# Patient Record
Sex: MALE | Race: OTHER | NOT HISPANIC OR LATINO | URBAN - METROPOLITAN AREA
[De-identification: names, ages, dates, MRNs, and addresses within clinical notes are randomized per-mention and may not be internally consistent; named-entity substitution may affect disease eponyms.]

---

## 2019-07-22 ENCOUNTER — INPATIENT (INPATIENT)
Facility: HOSPITAL | Age: 59
LOS: 0 days | Discharge: HOME | DRG: 313 | End: 2019-07-23
Attending: INTERNAL MEDICINE | Admitting: INTERNAL MEDICINE
Payer: MEDICAID

## 2019-07-22 VITALS
OXYGEN SATURATION: 97 % | HEIGHT: 74 IN | WEIGHT: 179.9 LBS | TEMPERATURE: 98 F | SYSTOLIC BLOOD PRESSURE: 126 MMHG | HEART RATE: 79 BPM | RESPIRATION RATE: 20 BRPM | DIASTOLIC BLOOD PRESSURE: 83 MMHG

## 2019-07-22 DIAGNOSIS — R53.1 WEAKNESS: ICD-10-CM

## 2019-07-22 DIAGNOSIS — Z95.5 PRESENCE OF CORONARY ANGIOPLASTY IMPLANT AND GRAFT: ICD-10-CM

## 2019-07-22 DIAGNOSIS — I24.9 ACUTE ISCHEMIC HEART DISEASE, UNSPECIFIED: ICD-10-CM

## 2019-07-22 DIAGNOSIS — E11.8 TYPE 2 DIABETES MELLITUS WITH UNSPECIFIED COMPLICATIONS: ICD-10-CM

## 2019-07-22 DIAGNOSIS — Z29.9 ENCOUNTER FOR PROPHYLACTIC MEASURES, UNSPECIFIED: ICD-10-CM

## 2019-07-22 DIAGNOSIS — I25.10 ATHEROSCLEROTIC HEART DISEASE OF NATIVE CORONARY ARTERY WITHOUT ANGINA PECTORIS: ICD-10-CM

## 2019-07-22 LAB
ALBUMIN SERPL ELPH-MCNC: 4 G/DL — SIGNIFICANT CHANGE UP (ref 3.5–5)
ALBUMIN SERPL ELPH-MCNC: 4.1 G/DL — SIGNIFICANT CHANGE UP (ref 3.5–5)
ALP SERPL-CCNC: 49 U/L — SIGNIFICANT CHANGE UP (ref 40–120)
ALP SERPL-CCNC: 50 U/L — SIGNIFICANT CHANGE UP (ref 40–120)
ALT FLD-CCNC: 25 U/L DA — SIGNIFICANT CHANGE UP (ref 10–60)
ALT FLD-CCNC: 27 U/L DA — SIGNIFICANT CHANGE UP (ref 10–60)
ANION GAP SERPL CALC-SCNC: 5 MMOL/L — SIGNIFICANT CHANGE UP (ref 5–17)
ANION GAP SERPL CALC-SCNC: 6 MMOL/L — SIGNIFICANT CHANGE UP (ref 5–17)
APPEARANCE UR: CLEAR — SIGNIFICANT CHANGE UP
APTT BLD: 29.9 SEC — SIGNIFICANT CHANGE UP (ref 27.5–36.3)
AST SERPL-CCNC: 17 U/L — SIGNIFICANT CHANGE UP (ref 10–40)
AST SERPL-CCNC: 18 U/L — SIGNIFICANT CHANGE UP (ref 10–40)
BASOPHILS # BLD AUTO: 0.03 K/UL — SIGNIFICANT CHANGE UP (ref 0–0.2)
BASOPHILS NFR BLD AUTO: 0.5 % — SIGNIFICANT CHANGE UP (ref 0–2)
BILIRUB SERPL-MCNC: 0.8 MG/DL — SIGNIFICANT CHANGE UP (ref 0.2–1.2)
BILIRUB SERPL-MCNC: 0.9 MG/DL — SIGNIFICANT CHANGE UP (ref 0.2–1.2)
BILIRUB UR-MCNC: NEGATIVE — SIGNIFICANT CHANGE UP
BUN SERPL-MCNC: 11 MG/DL — SIGNIFICANT CHANGE UP (ref 7–18)
BUN SERPL-MCNC: 8 MG/DL — SIGNIFICANT CHANGE UP (ref 7–18)
CALCIUM SERPL-MCNC: 8.8 MG/DL — SIGNIFICANT CHANGE UP (ref 8.4–10.5)
CALCIUM SERPL-MCNC: 9 MG/DL — SIGNIFICANT CHANGE UP (ref 8.4–10.5)
CHLORIDE SERPL-SCNC: 102 MMOL/L — SIGNIFICANT CHANGE UP (ref 96–108)
CHLORIDE SERPL-SCNC: 106 MMOL/L — SIGNIFICANT CHANGE UP (ref 96–108)
CK MB BLD-MCNC: 1.3 % — SIGNIFICANT CHANGE UP (ref 0–3.5)
CK MB CFR SERPL CALC: 3.5 NG/ML — SIGNIFICANT CHANGE UP (ref 0–3.6)
CK SERPL-CCNC: 274 U/L — HIGH (ref 35–232)
CK SERPL-CCNC: 276 U/L — HIGH (ref 35–232)
CO2 SERPL-SCNC: 29 MMOL/L — SIGNIFICANT CHANGE UP (ref 22–31)
CO2 SERPL-SCNC: 29 MMOL/L — SIGNIFICANT CHANGE UP (ref 22–31)
COLOR SPEC: YELLOW — SIGNIFICANT CHANGE UP
CREAT SERPL-MCNC: 0.82 MG/DL — SIGNIFICANT CHANGE UP (ref 0.5–1.3)
CREAT SERPL-MCNC: 1.04 MG/DL — SIGNIFICANT CHANGE UP (ref 0.5–1.3)
DIFF PNL FLD: NEGATIVE — SIGNIFICANT CHANGE UP
EOSINOPHIL # BLD AUTO: 0.4 K/UL — SIGNIFICANT CHANGE UP (ref 0–0.5)
EOSINOPHIL NFR BLD AUTO: 6.3 % — HIGH (ref 0–6)
EPI CELLS # UR: ABNORMAL /HPF
GLUCOSE BLDC GLUCOMTR-MCNC: 179 MG/DL — HIGH (ref 70–99)
GLUCOSE BLDC GLUCOMTR-MCNC: 218 MG/DL — HIGH (ref 70–99)
GLUCOSE SERPL-MCNC: 100 MG/DL — HIGH (ref 70–99)
GLUCOSE SERPL-MCNC: 219 MG/DL — HIGH (ref 70–99)
GLUCOSE UR QL: 250
HCT VFR BLD CALC: 43.2 % — SIGNIFICANT CHANGE UP (ref 39–50)
HCT VFR BLD CALC: 43.2 % — SIGNIFICANT CHANGE UP (ref 39–50)
HGB BLD-MCNC: 14.4 G/DL — SIGNIFICANT CHANGE UP (ref 13–17)
HGB BLD-MCNC: 14.6 G/DL — SIGNIFICANT CHANGE UP (ref 13–17)
IMM GRANULOCYTES NFR BLD AUTO: 0.3 % — SIGNIFICANT CHANGE UP (ref 0–1.5)
INR BLD: 1.02 RATIO — SIGNIFICANT CHANGE UP (ref 0.88–1.16)
IRON SATN MFR SERPL: 14 % — LOW (ref 20–55)
IRON SATN MFR SERPL: 49 UG/DL — LOW (ref 65–170)
KETONES UR-MCNC: NEGATIVE — SIGNIFICANT CHANGE UP
LACTATE SERPL-SCNC: 2 MMOL/L — SIGNIFICANT CHANGE UP (ref 0.7–2)
LEUKOCYTE ESTERASE UR-ACNC: ABNORMAL
LIDOCAIN IGE QN: 141 U/L — SIGNIFICANT CHANGE UP (ref 73–393)
LYMPHOCYTES # BLD AUTO: 1.32 K/UL — SIGNIFICANT CHANGE UP (ref 1–3.3)
LYMPHOCYTES # BLD AUTO: 20.9 % — SIGNIFICANT CHANGE UP (ref 13–44)
MAGNESIUM SERPL-MCNC: 1.7 MG/DL — SIGNIFICANT CHANGE UP (ref 1.6–2.6)
MAGNESIUM SERPL-MCNC: 1.9 MG/DL — SIGNIFICANT CHANGE UP (ref 1.6–2.6)
MCHC RBC-ENTMCNC: 29.7 PG — SIGNIFICANT CHANGE UP (ref 27–34)
MCHC RBC-ENTMCNC: 30.2 PG — SIGNIFICANT CHANGE UP (ref 27–34)
MCHC RBC-ENTMCNC: 33.3 GM/DL — SIGNIFICANT CHANGE UP (ref 32–36)
MCHC RBC-ENTMCNC: 33.8 GM/DL — SIGNIFICANT CHANGE UP (ref 32–36)
MCV RBC AUTO: 89.1 FL — SIGNIFICANT CHANGE UP (ref 80–100)
MCV RBC AUTO: 89.4 FL — SIGNIFICANT CHANGE UP (ref 80–100)
MONOCYTES # BLD AUTO: 0.49 K/UL — SIGNIFICANT CHANGE UP (ref 0–0.9)
MONOCYTES NFR BLD AUTO: 7.8 % — SIGNIFICANT CHANGE UP (ref 2–14)
NEUTROPHILS # BLD AUTO: 4.05 K/UL — SIGNIFICANT CHANGE UP (ref 1.8–7.4)
NEUTROPHILS NFR BLD AUTO: 64.2 % — SIGNIFICANT CHANGE UP (ref 43–77)
NITRITE UR-MCNC: NEGATIVE — SIGNIFICANT CHANGE UP
NRBC # BLD: 0 /100 WBCS — SIGNIFICANT CHANGE UP (ref 0–0)
NRBC # BLD: 0 /100 WBCS — SIGNIFICANT CHANGE UP (ref 0–0)
PH UR: 6 — SIGNIFICANT CHANGE UP (ref 5–8)
PHOSPHATE SERPL-MCNC: 3.8 MG/DL — SIGNIFICANT CHANGE UP (ref 2.5–4.5)
PLATELET # BLD AUTO: 205 K/UL — SIGNIFICANT CHANGE UP (ref 150–400)
PLATELET # BLD AUTO: 208 K/UL — SIGNIFICANT CHANGE UP (ref 150–400)
POTASSIUM SERPL-MCNC: 4 MMOL/L — SIGNIFICANT CHANGE UP (ref 3.5–5.3)
POTASSIUM SERPL-MCNC: 4.2 MMOL/L — SIGNIFICANT CHANGE UP (ref 3.5–5.3)
POTASSIUM SERPL-SCNC: 4 MMOL/L — SIGNIFICANT CHANGE UP (ref 3.5–5.3)
POTASSIUM SERPL-SCNC: 4.2 MMOL/L — SIGNIFICANT CHANGE UP (ref 3.5–5.3)
PROT SERPL-MCNC: 7.8 G/DL — SIGNIFICANT CHANGE UP (ref 6–8.3)
PROT SERPL-MCNC: 8.1 G/DL — SIGNIFICANT CHANGE UP (ref 6–8.3)
PROT UR-MCNC: 15
PROTHROM AB SERPL-ACNC: 11.3 SEC — SIGNIFICANT CHANGE UP (ref 10–12.9)
RBC # BLD: 4.83 M/UL — SIGNIFICANT CHANGE UP (ref 4.2–5.8)
RBC # BLD: 4.85 M/UL — SIGNIFICANT CHANGE UP (ref 4.2–5.8)
RBC # FLD: 12.5 % — SIGNIFICANT CHANGE UP (ref 10.3–14.5)
RBC # FLD: 12.6 % — SIGNIFICANT CHANGE UP (ref 10.3–14.5)
SODIUM SERPL-SCNC: 136 MMOL/L — SIGNIFICANT CHANGE UP (ref 135–145)
SODIUM SERPL-SCNC: 141 MMOL/L — SIGNIFICANT CHANGE UP (ref 135–145)
SP GR SPEC: 1 — LOW (ref 1.01–1.02)
TIBC SERPL-MCNC: 347 UG/DL — SIGNIFICANT CHANGE UP (ref 250–450)
TROPONIN I SERPL-MCNC: <0.015 NG/ML — SIGNIFICANT CHANGE UP (ref 0–0.04)
TROPONIN I SERPL-MCNC: <0.015 NG/ML — SIGNIFICANT CHANGE UP (ref 0–0.04)
TSH SERPL-MCNC: 0.72 UU/ML — SIGNIFICANT CHANGE UP (ref 0.34–4.82)
UIBC SERPL-MCNC: 298 UG/DL — SIGNIFICANT CHANGE UP (ref 110–370)
UROBILINOGEN FLD QL: NEGATIVE — SIGNIFICANT CHANGE UP
WBC # BLD: 6.31 K/UL — SIGNIFICANT CHANGE UP (ref 3.8–10.5)
WBC # BLD: 7.24 K/UL — SIGNIFICANT CHANGE UP (ref 3.8–10.5)
WBC # FLD AUTO: 6.31 K/UL — SIGNIFICANT CHANGE UP (ref 3.8–10.5)
WBC # FLD AUTO: 7.24 K/UL — SIGNIFICANT CHANGE UP (ref 3.8–10.5)
WBC UR QL: SIGNIFICANT CHANGE UP /HPF (ref 0–5)

## 2019-07-22 PROCEDURE — 99285 EMERGENCY DEPT VISIT HI MDM: CPT

## 2019-07-22 PROCEDURE — 71045 X-RAY EXAM CHEST 1 VIEW: CPT | Mod: 26

## 2019-07-22 RX ORDER — SITAGLIPTIN 50 MG/1
1 TABLET, FILM COATED ORAL
Qty: 0 | Refills: 0 | DISCHARGE

## 2019-07-22 RX ORDER — GLUCAGON INJECTION, SOLUTION 0.5 MG/.1ML
1 INJECTION, SOLUTION SUBCUTANEOUS ONCE
Refills: 0 | Status: DISCONTINUED | OUTPATIENT
Start: 2019-07-22 | End: 2019-07-23

## 2019-07-22 RX ORDER — ACETAMINOPHEN 500 MG
650 TABLET ORAL EVERY 6 HOURS
Refills: 0 | Status: DISCONTINUED | OUTPATIENT
Start: 2019-07-22 | End: 2019-07-23

## 2019-07-22 RX ORDER — SODIUM CHLORIDE 9 MG/ML
1000 INJECTION INTRAMUSCULAR; INTRAVENOUS; SUBCUTANEOUS
Refills: 0 | Status: DISCONTINUED | OUTPATIENT
Start: 2019-07-22 | End: 2019-07-23

## 2019-07-22 RX ORDER — INSULIN LISPRO 100/ML
VIAL (ML) SUBCUTANEOUS
Refills: 0 | Status: DISCONTINUED | OUTPATIENT
Start: 2019-07-22 | End: 2019-07-23

## 2019-07-22 RX ORDER — ATORVASTATIN CALCIUM 80 MG/1
40 TABLET, FILM COATED ORAL AT BEDTIME
Refills: 0 | Status: DISCONTINUED | OUTPATIENT
Start: 2019-07-22 | End: 2019-07-23

## 2019-07-22 RX ORDER — ATORVASTATIN CALCIUM 80 MG/1
1 TABLET, FILM COATED ORAL
Qty: 0 | Refills: 0 | DISCHARGE

## 2019-07-22 RX ORDER — OXYCODONE AND ACETAMINOPHEN 5; 325 MG/1; MG/1
1 TABLET ORAL EVERY 6 HOURS
Refills: 0 | Status: DISCONTINUED | OUTPATIENT
Start: 2019-07-22 | End: 2019-07-23

## 2019-07-22 RX ORDER — DEXTROSE 50 % IN WATER 50 %
12.5 SYRINGE (ML) INTRAVENOUS ONCE
Refills: 0 | Status: DISCONTINUED | OUTPATIENT
Start: 2019-07-22 | End: 2019-07-23

## 2019-07-22 RX ORDER — ENOXAPARIN SODIUM 100 MG/ML
40 INJECTION SUBCUTANEOUS DAILY
Refills: 0 | Status: DISCONTINUED | OUTPATIENT
Start: 2019-07-22 | End: 2019-07-23

## 2019-07-22 RX ORDER — DEXTROSE 50 % IN WATER 50 %
25 SYRINGE (ML) INTRAVENOUS ONCE
Refills: 0 | Status: DISCONTINUED | OUTPATIENT
Start: 2019-07-22 | End: 2019-07-23

## 2019-07-22 RX ORDER — CEFTRIAXONE 500 MG/1
1000 INJECTION, POWDER, FOR SOLUTION INTRAMUSCULAR; INTRAVENOUS ONCE
Refills: 0 | Status: COMPLETED | OUTPATIENT
Start: 2019-07-22 | End: 2019-07-22

## 2019-07-22 RX ORDER — METOPROLOL TARTRATE 50 MG
12.5 TABLET ORAL DAILY
Refills: 0 | Status: DISCONTINUED | OUTPATIENT
Start: 2019-07-22 | End: 2019-07-23

## 2019-07-22 RX ORDER — SODIUM CHLORIDE 9 MG/ML
1000 INJECTION, SOLUTION INTRAVENOUS
Refills: 0 | Status: DISCONTINUED | OUTPATIENT
Start: 2019-07-22 | End: 2019-07-23

## 2019-07-22 RX ORDER — AZITHROMYCIN 500 MG/1
500 TABLET, FILM COATED ORAL ONCE
Refills: 0 | Status: COMPLETED | OUTPATIENT
Start: 2019-07-22 | End: 2019-07-22

## 2019-07-22 RX ORDER — ASPIRIN/CALCIUM CARB/MAGNESIUM 324 MG
81 TABLET ORAL ONCE
Refills: 0 | Status: COMPLETED | OUTPATIENT
Start: 2019-07-22 | End: 2019-07-22

## 2019-07-22 RX ORDER — DEXTROSE 50 % IN WATER 50 %
15 SYRINGE (ML) INTRAVENOUS ONCE
Refills: 0 | Status: DISCONTINUED | OUTPATIENT
Start: 2019-07-22 | End: 2019-07-23

## 2019-07-22 RX ORDER — ACETAMINOPHEN 500 MG
650 TABLET ORAL ONCE
Refills: 0 | Status: COMPLETED | OUTPATIENT
Start: 2019-07-22 | End: 2019-07-22

## 2019-07-22 RX ORDER — METFORMIN HYDROCHLORIDE 850 MG/1
1 TABLET ORAL
Qty: 0 | Refills: 0 | DISCHARGE

## 2019-07-22 RX ADMIN — CEFTRIAXONE 1000 MILLIGRAM(S): 500 INJECTION, POWDER, FOR SOLUTION INTRAMUSCULAR; INTRAVENOUS at 12:56

## 2019-07-22 RX ADMIN — SODIUM CHLORIDE 70 MILLILITER(S): 9 INJECTION INTRAMUSCULAR; INTRAVENOUS; SUBCUTANEOUS at 23:08

## 2019-07-22 RX ADMIN — Medication 81 MILLIGRAM(S): at 23:08

## 2019-07-22 RX ADMIN — ATORVASTATIN CALCIUM 40 MILLIGRAM(S): 80 TABLET, FILM COATED ORAL at 23:07

## 2019-07-22 RX ADMIN — AZITHROMYCIN 250 MILLIGRAM(S): 500 TABLET, FILM COATED ORAL at 12:57

## 2019-07-22 RX ADMIN — Medication 2: at 23:08

## 2019-07-22 RX ADMIN — SODIUM CHLORIDE 125 MILLILITER(S): 9 INJECTION INTRAMUSCULAR; INTRAVENOUS; SUBCUTANEOUS at 12:47

## 2019-07-22 RX ADMIN — Medication 650 MILLIGRAM(S): at 20:39

## 2019-07-22 RX ADMIN — Medication 650 MILLIGRAM(S): at 21:53

## 2019-07-22 RX ADMIN — CEFTRIAXONE 100 MILLIGRAM(S): 500 INJECTION, POWDER, FOR SOLUTION INTRAMUSCULAR; INTRAVENOUS at 12:47

## 2019-07-22 NOTE — ED PROVIDER NOTE - CLINICAL SUMMARY MEDICAL DECISION MAKING FREE TEXT BOX
weakness, coughing, no appetite, concern for infectious process, will get labs, give treatment, admission

## 2019-07-22 NOTE — H&P ADULT - NSICDXPASTMEDICALHX_GEN_ALL_CORE_FT
PAST MEDICAL HISTORY:  CAD (coronary artery disease) 2 stents    DM (diabetes mellitus) for 10 years    HTN (hypertension) 5 years ago    Stented coronary artery one 9 years ago and other 3 months ago

## 2019-07-22 NOTE — H&P ADULT - PROBLEM SELECTOR PLAN 3
HBA1 c ordered,  gabapentin started after cardiac enzymes HBA1 c F/U  gabapentin MAY HELP PAIN - Patient is Januvia and Metformin at home  - will hold home medications  - Started on HSS for now   - Monitor blood sugars AC/HS and adjust as needed  - f/u HgA1c   - Carbohydrate consistent diabetic diet with evening snacks.

## 2019-07-22 NOTE — H&P ADULT - PROBLEM SELECTOR PLAN 1
EKG : show normal sinus rhythm.  cardiac enzymes ordered and f/u EKG : show normal sinus rhythm.  cardiac enzymes T1 NEG , f/u T2 AND T3 EKG : show normal sinus rhythm.  cardiac enzymes T1 NEG , f/u T2 AND T3  f/u with stress test.  Dr khan consulted EKG : show normal sinus rhythm.  cardiac enzymes T1 NEG , f/u T2 AND T3  f/u with stress test.  Dr Curry consulted - Patient p/w CP   - EKG NSR   - T1 -ve , T2 -ve  , T3 @ 01:00 AM  - c/w ASA 81 mg, beta blocker and High dose statin   - Follow up TSH, HbA1C, Lipid profile, TTE  - ((No coffee or tea since midnight))  - Stress Test at AM   - On Tele.  ** Cardiologist Consulted Dr. Curry

## 2019-07-22 NOTE — H&P ADULT - NSHPSOCIALHISTORY_GEN_ALL_CORE
lives at home with wife and kids.  lives at home with wife and kids.  Denies smoking, alcohol, illicit drug use.

## 2019-07-22 NOTE — H&P ADULT - RS GEN PE MLT RESP DETAILS PC
no subcutaneous emphysema/no rales/good air movement/no rhonchi/diminished breath sounds, R/respirations non-labored/airway patent/no wheezes/no chest wall tenderness/no intercostal retractions

## 2019-07-22 NOTE — H&P ADULT - ASSESSMENT
60 yo male with PMH of DM and CAD had cough for 2 weeks and after finishing abx course he felt really weak and generalized pain( 5/10 )( mainly legs and shoulder) in all over his body. Even after finishing Abx course his cough came back .     ED COURSE: PT recieved IVPB OF ceftriaxone 1000mg and azithromycin 500mg 60 yo male with PMH of DM and CAD had cough for 2 weeks and after finishing abx course he felt really weak and generalized pain( 5/10 )( mainly legs and shoulder) in all over his body. Even after finishing Abx course his cough came back .     ED COURSE: PT recieved IVPB OF ceftriaxone 1000mg and azithromycin 500mg    pt is admitted to r/o ACS

## 2019-07-22 NOTE — ED ADULT NURSE NOTE - NSIMPLEMENTINTERV_GEN_ALL_ED
Implemented All Universal Safety Interventions:  [place] to call system. Call bell, personal items and telephone within reach. Instruct patient to call for assistance. Room bathroom lighting operational. Non-slip footwear when patient is off stretcher. Physically safe environment: no spills, clutter or unnecessary equipment. Stretcher in lowest position, wheels locked, appropriate side rails in place.

## 2019-07-22 NOTE — H&P ADULT - PROBLEM SELECTOR PLAN 4
observation anticoagulation. IMPROVE VTE Individual Risk Assessment    RISK                                                                Points    [  ] Previous VTE                                                  3    [  ] Thrombophilia                                               2    [  ] Lower limb paralysis                                      2        (unable to hold up >15 seconds)      [  ] Current Cancer                                              2         (within 6 months)  [X] Immobilization > 24 hrs                                1  [  ] ICU/CCU stay > 24 hours                              1    [X] Age > 60                                                      1    IMPROVE VTE Score _____2____  c/w  levenox IMPROVE VTE Individual Risk Assessment    RISK                                                                Points    [  ] Previous VTE                                                  3    [  ] Thrombophilia                                               2    [  ] Lower limb paralysis                                      2        (unable to hold up >15 seconds)      [  ] Current Cancer                                              2         (within 6 months)  [X] Immobilization > 24 hrs                                1  [  ] ICU/CCU stay > 24 hours                              1    [X] Age > 60                                                      1    IMPROVE VTE Score _____2____  c/w  lovenox

## 2019-07-22 NOTE — H&P ADULT - PROBLEM SELECTOR PROBLEM 1
Coronary artery disease, angina presence unspecified, unspecified vessel or lesion type, unspecified whether native or transplanted heart ACS (acute coronary syndrome)

## 2019-07-22 NOTE — H&P ADULT - NSHPPHYSICALEXAM_GEN_ALL_CORE
Vital Signs Last 24 Hrs  T(C): 37.1 (22 Jul 2019 20:22), Max: 37.1 (22 Jul 2019 20:22)  T(F): 98.7 (22 Jul 2019 20:22), Max: 98.7 (22 Jul 2019 20:22)  HR: 92 (22 Jul 2019 20:22) (67 - 92)  BP: 131/89 (22 Jul 2019 20:22) (126/83 - 131/89)  BP(mean): --  RR: 16 (22 Jul 2019 20:22) (16 - 20)  SpO2: 99% (22 Jul 2019 20:22) (97% - 99%)

## 2019-07-22 NOTE — H&P ADULT - HISTORY OF PRESENT ILLNESS
58 yo Male with PMH of DM FOR 10 years and 2 coronary stents placed (9 years and 3 months ago ) c/o generalized weakness and pain after having cough for 2 weeks . his pcp prescribed him antibiotics and as he finished his Abx his cough came back and on friday he went back to his PCP. His PCP recomended going to ER if he feels any worst. today he came in to ER. As patient had a coronory  stent placed on finding out he has blockade after going in dr office for normal work up. 60 yo Male with PMH of DM FOR 10 years and 2 coronary stents placed (9 years and 3 months ago ) c/o generalized weakness and pain after having dry cough for 2 weeks .decreased apatite for 1 week. His pcp prescribed him antibiotics and as he finished his Abx his cough came back and on friday he went back to his PCP. His PCP recomended going to ER if he feels any worst. today he came in to ER. As patient had a coronory  stent placed on finding out he has blockade after going in dr office for normal work up.

## 2019-07-22 NOTE — H&P ADULT - PROBLEM SELECTOR PLAN 2
test vit B12 ,folate and tSH LEVELS - p/w generalized weakness   - fall precautions   - f/u vit B12 ,folate and TSH LEVELS  - f/u PT

## 2019-07-22 NOTE — ED PROVIDER NOTE - OBJECTIVE STATEMENT
59 y.o. male with h/o NIDDM, last dose this am, pt c/o feeling weak, increased fatigue, felt feverish, decreased appetite x 1 week, dry coughing for 3 weeks, treated with Ab for 7 days with improvement, but coughing recurs, myalgia, no chills, CP, SOB, leg edema, dizziness, recent travelling.  Pt saw PMD 3 days ago, advised to go to ED for evaluation

## 2019-07-23 VITALS
TEMPERATURE: 98 F | SYSTOLIC BLOOD PRESSURE: 118 MMHG | OXYGEN SATURATION: 100 % | DIASTOLIC BLOOD PRESSURE: 71 MMHG | RESPIRATION RATE: 18 BRPM | HEART RATE: 82 BPM

## 2019-07-23 DIAGNOSIS — D64.9 ANEMIA, UNSPECIFIED: ICD-10-CM

## 2019-07-23 DIAGNOSIS — I10 ESSENTIAL (PRIMARY) HYPERTENSION: ICD-10-CM

## 2019-07-23 LAB
24R-OH-CALCIDIOL SERPL-MCNC: 22 NG/ML — LOW (ref 30–80)
ALBUMIN SERPL ELPH-MCNC: 3.7 G/DL — SIGNIFICANT CHANGE UP (ref 3.5–5)
ALP SERPL-CCNC: 46 U/L — SIGNIFICANT CHANGE UP (ref 40–120)
ALT FLD-CCNC: 21 U/L DA — SIGNIFICANT CHANGE UP (ref 10–60)
ANION GAP SERPL CALC-SCNC: 7 MMOL/L — SIGNIFICANT CHANGE UP (ref 5–17)
AST SERPL-CCNC: 13 U/L — SIGNIFICANT CHANGE UP (ref 10–40)
BASOPHILS # BLD AUTO: 0.04 K/UL — SIGNIFICANT CHANGE UP (ref 0–0.2)
BASOPHILS NFR BLD AUTO: 0.6 % — SIGNIFICANT CHANGE UP (ref 0–2)
BILIRUB SERPL-MCNC: 0.8 MG/DL — SIGNIFICANT CHANGE UP (ref 0.2–1.2)
BUN SERPL-MCNC: 14 MG/DL — SIGNIFICANT CHANGE UP (ref 7–18)
CALCIUM SERPL-MCNC: 8.3 MG/DL — LOW (ref 8.4–10.5)
CHLORIDE SERPL-SCNC: 102 MMOL/L — SIGNIFICANT CHANGE UP (ref 96–108)
CHOLEST SERPL-MCNC: 89 MG/DL — SIGNIFICANT CHANGE UP (ref 10–199)
CK MB BLD-MCNC: 1.1 % — SIGNIFICANT CHANGE UP (ref 0–3.5)
CK MB CFR SERPL CALC: 2.6 NG/ML — SIGNIFICANT CHANGE UP (ref 0–3.6)
CK SERPL-CCNC: 228 U/L — SIGNIFICANT CHANGE UP (ref 35–232)
CO2 SERPL-SCNC: 26 MMOL/L — SIGNIFICANT CHANGE UP (ref 22–31)
CREAT SERPL-MCNC: 0.88 MG/DL — SIGNIFICANT CHANGE UP (ref 0.5–1.3)
CULTURE RESULTS: SIGNIFICANT CHANGE UP
EOSINOPHIL # BLD AUTO: 0.4 K/UL — SIGNIFICANT CHANGE UP (ref 0–0.5)
EOSINOPHIL NFR BLD AUTO: 6.4 % — HIGH (ref 0–6)
FOLATE SERPL-MCNC: 10.2 NG/ML — SIGNIFICANT CHANGE UP
FOLATE SERPL-MCNC: 15.3 NG/ML — SIGNIFICANT CHANGE UP
GLUCOSE BLDC GLUCOMTR-MCNC: 194 MG/DL — HIGH (ref 70–99)
GLUCOSE BLDC GLUCOMTR-MCNC: 212 MG/DL — HIGH (ref 70–99)
GLUCOSE SERPL-MCNC: 180 MG/DL — HIGH (ref 70–99)
HBA1C BLD-MCNC: 8.4 % — HIGH (ref 4–5.6)
HBA1C BLD-MCNC: 8.5 % — HIGH (ref 4–5.6)
HCT VFR BLD CALC: 40 % — SIGNIFICANT CHANGE UP (ref 39–50)
HCV AB S/CO SERPL IA: 0.06 S/CO — SIGNIFICANT CHANGE UP (ref 0–0.99)
HCV AB SERPL-IMP: SIGNIFICANT CHANGE UP
HDLC SERPL-MCNC: 36 MG/DL — LOW
HGB BLD-MCNC: 13.5 G/DL — SIGNIFICANT CHANGE UP (ref 13–17)
IMM GRANULOCYTES NFR BLD AUTO: 0.2 % — SIGNIFICANT CHANGE UP (ref 0–1.5)
LIPID PNL WITH DIRECT LDL SERPL: 7 MG/DL — SIGNIFICANT CHANGE UP
LYMPHOCYTES # BLD AUTO: 2 K/UL — SIGNIFICANT CHANGE UP (ref 1–3.3)
LYMPHOCYTES # BLD AUTO: 31.8 % — SIGNIFICANT CHANGE UP (ref 13–44)
MAGNESIUM SERPL-MCNC: 1.8 MG/DL — SIGNIFICANT CHANGE UP (ref 1.6–2.6)
MCHC RBC-ENTMCNC: 29.9 PG — SIGNIFICANT CHANGE UP (ref 27–34)
MCHC RBC-ENTMCNC: 33.8 GM/DL — SIGNIFICANT CHANGE UP (ref 32–36)
MCV RBC AUTO: 88.5 FL — SIGNIFICANT CHANGE UP (ref 80–100)
MONOCYTES # BLD AUTO: 0.6 K/UL — SIGNIFICANT CHANGE UP (ref 0–0.9)
MONOCYTES NFR BLD AUTO: 9.5 % — SIGNIFICANT CHANGE UP (ref 2–14)
NEUTROPHILS # BLD AUTO: 3.24 K/UL — SIGNIFICANT CHANGE UP (ref 1.8–7.4)
NEUTROPHILS NFR BLD AUTO: 51.5 % — SIGNIFICANT CHANGE UP (ref 43–77)
NRBC # BLD: 0 /100 WBCS — SIGNIFICANT CHANGE UP (ref 0–0)
PHOSPHATE SERPL-MCNC: 4.5 MG/DL — SIGNIFICANT CHANGE UP (ref 2.5–4.5)
PLATELET # BLD AUTO: 203 K/UL — SIGNIFICANT CHANGE UP (ref 150–400)
POTASSIUM SERPL-MCNC: 4 MMOL/L — SIGNIFICANT CHANGE UP (ref 3.5–5.3)
POTASSIUM SERPL-SCNC: 4 MMOL/L — SIGNIFICANT CHANGE UP (ref 3.5–5.3)
PROT SERPL-MCNC: 7.3 G/DL — SIGNIFICANT CHANGE UP (ref 6–8.3)
RBC # BLD: 4.52 M/UL — SIGNIFICANT CHANGE UP (ref 4.2–5.8)
RBC # FLD: 12.6 % — SIGNIFICANT CHANGE UP (ref 10.3–14.5)
SODIUM SERPL-SCNC: 135 MMOL/L — SIGNIFICANT CHANGE UP (ref 135–145)
SPECIMEN SOURCE: SIGNIFICANT CHANGE UP
TOTAL CHOLESTEROL/HDL RATIO MEASUREMENT: 2.5 RATIO — LOW (ref 3.4–9.6)
TRIGL SERPL-MCNC: 228 MG/DL — HIGH (ref 10–149)
TROPONIN I SERPL-MCNC: <0.015 NG/ML — SIGNIFICANT CHANGE UP (ref 0–0.04)
TSH SERPL-MCNC: 1.4 UU/ML — SIGNIFICANT CHANGE UP (ref 0.34–4.82)
VIT B12 SERPL-MCNC: 408 PG/ML — SIGNIFICANT CHANGE UP (ref 232–1245)
VIT B12 SERPL-MCNC: 529 PG/ML — SIGNIFICANT CHANGE UP (ref 232–1245)
WBC # BLD: 6.29 K/UL — SIGNIFICANT CHANGE UP (ref 3.8–10.5)
WBC # FLD AUTO: 6.29 K/UL — SIGNIFICANT CHANGE UP (ref 3.8–10.5)

## 2019-07-23 PROCEDURE — 85730 THROMBOPLASTIN TIME PARTIAL: CPT

## 2019-07-23 PROCEDURE — 83605 ASSAY OF LACTIC ACID: CPT

## 2019-07-23 PROCEDURE — 85027 COMPLETE CBC AUTOMATED: CPT

## 2019-07-23 PROCEDURE — 78452 HT MUSCLE IMAGE SPECT MULT: CPT | Mod: 26

## 2019-07-23 PROCEDURE — 86803 HEPATITIS C AB TEST: CPT

## 2019-07-23 PROCEDURE — 80053 COMPREHEN METABOLIC PANEL: CPT

## 2019-07-23 PROCEDURE — 82550 ASSAY OF CK (CPK): CPT

## 2019-07-23 PROCEDURE — A9502: CPT

## 2019-07-23 PROCEDURE — 83036 HEMOGLOBIN GLYCOSYLATED A1C: CPT

## 2019-07-23 PROCEDURE — 93005 ELECTROCARDIOGRAM TRACING: CPT

## 2019-07-23 PROCEDURE — 87040 BLOOD CULTURE FOR BACTERIA: CPT

## 2019-07-23 PROCEDURE — 82962 GLUCOSE BLOOD TEST: CPT

## 2019-07-23 PROCEDURE — 84484 ASSAY OF TROPONIN QUANT: CPT

## 2019-07-23 PROCEDURE — 78452 HT MUSCLE IMAGE SPECT MULT: CPT

## 2019-07-23 PROCEDURE — 83550 IRON BINDING TEST: CPT

## 2019-07-23 PROCEDURE — 82306 VITAMIN D 25 HYDROXY: CPT

## 2019-07-23 PROCEDURE — 83540 ASSAY OF IRON: CPT

## 2019-07-23 PROCEDURE — 83690 ASSAY OF LIPASE: CPT

## 2019-07-23 PROCEDURE — 84443 ASSAY THYROID STIM HORMONE: CPT

## 2019-07-23 PROCEDURE — 85610 PROTHROMBIN TIME: CPT

## 2019-07-23 PROCEDURE — 84100 ASSAY OF PHOSPHORUS: CPT

## 2019-07-23 PROCEDURE — 93306 TTE W/DOPPLER COMPLETE: CPT

## 2019-07-23 PROCEDURE — 82746 ASSAY OF FOLIC ACID SERUM: CPT

## 2019-07-23 PROCEDURE — 93017 CV STRESS TEST TRACING ONLY: CPT

## 2019-07-23 PROCEDURE — 82553 CREATINE MB FRACTION: CPT

## 2019-07-23 PROCEDURE — 93016 CV STRESS TEST SUPVJ ONLY: CPT

## 2019-07-23 PROCEDURE — 83735 ASSAY OF MAGNESIUM: CPT

## 2019-07-23 PROCEDURE — 82607 VITAMIN B-12: CPT

## 2019-07-23 PROCEDURE — 96375 TX/PRO/DX INJ NEW DRUG ADDON: CPT

## 2019-07-23 PROCEDURE — 81001 URINALYSIS AUTO W/SCOPE: CPT

## 2019-07-23 PROCEDURE — 99285 EMERGENCY DEPT VISIT HI MDM: CPT | Mod: 25

## 2019-07-23 PROCEDURE — 80061 LIPID PANEL: CPT

## 2019-07-23 PROCEDURE — 71045 X-RAY EXAM CHEST 1 VIEW: CPT

## 2019-07-23 PROCEDURE — 93018 CV STRESS TEST I&R ONLY: CPT

## 2019-07-23 PROCEDURE — 87086 URINE CULTURE/COLONY COUNT: CPT

## 2019-07-23 PROCEDURE — 36415 COLL VENOUS BLD VENIPUNCTURE: CPT

## 2019-07-23 PROCEDURE — 96374 THER/PROPH/DIAG INJ IV PUSH: CPT

## 2019-07-23 RX ORDER — MAGNESIUM SULFATE 500 MG/ML
1 VIAL (ML) INJECTION ONCE
Refills: 0 | Status: COMPLETED | OUTPATIENT
Start: 2019-07-23 | End: 2019-07-23

## 2019-07-23 RX ORDER — SODIUM CHLORIDE 0.65 %
2 AEROSOL, SPRAY (ML) NASAL
Qty: 0 | Refills: 0 | DISCHARGE

## 2019-07-23 RX ORDER — ACETAMINOPHEN 500 MG
2 TABLET ORAL
Qty: 0 | Refills: 0 | DISCHARGE
Start: 2019-07-23

## 2019-07-23 RX ADMIN — Medication 650 MILLIGRAM(S): at 14:49

## 2019-07-23 RX ADMIN — ENOXAPARIN SODIUM 40 MILLIGRAM(S): 100 INJECTION SUBCUTANEOUS at 11:46

## 2019-07-23 RX ADMIN — OXYCODONE AND ACETAMINOPHEN 1 TABLET(S): 5; 325 TABLET ORAL at 06:40

## 2019-07-23 RX ADMIN — Medication 4: at 12:05

## 2019-07-23 RX ADMIN — Medication 12.5 MILLIGRAM(S): at 06:41

## 2019-07-23 RX ADMIN — Medication 100 GRAM(S): at 11:46

## 2019-07-23 RX ADMIN — Medication 650 MILLIGRAM(S): at 15:23

## 2019-07-23 RX ADMIN — OXYCODONE AND ACETAMINOPHEN 1 TABLET(S): 5; 325 TABLET ORAL at 07:37

## 2019-07-23 NOTE — DISCHARGE NOTE PROVIDER - CARE PROVIDER_API CALL
Randal Barr)  Medicine  [address] [place] [address]  Phone: [phone]  Fax: [phone]  Follow Up Time:     Guilherme Curry)  Medicine  Dept Director  [address] [place], [place] [id number]  Phone: [phone]  Fax: [phone]  Follow Up Time:

## 2019-07-23 NOTE — DISCHARGE NOTE NURSING/CASE MANAGEMENT/SOCIAL WORK - NSDCDPATPORTLINK_GEN_ALL_CORE
You can access the [facility] Portal, offered by [facility], by registering with the following website: [email or web address]

## 2019-07-23 NOTE — PROGRESS NOTE ADULT - PROBLEM SELECTOR PLAN 4
- Patient is Januvia and Metformin at home  - will hold home medications  - Started on HSS for now    HgA1c is 8.4

## 2019-07-23 NOTE — PROGRESS NOTE ADULT - PROBLEM SELECTOR PLAN 2
c/o generalized weakness  Hb is stable  Serum iron is low and transferrin sat is low  f/u occult blood

## 2019-07-23 NOTE — CONSULT NOTE ADULT - PROBLEM SELECTOR RECOMMENDATION 9
Likely viral syndrome as pt came with generalized body weakness, cough, subjective fever. Pt denied any chest pain or discomfort.  EKG shows NSR. No events on telemetry.  Serial cardiac enzymes are normal  Given recent h/o stent placement and risk factors like DM and HTN, we will do stress test and echocardiogram.

## 2019-07-23 NOTE — CONSULT NOTE ADULT - ASSESSMENT
Patient is a 58 yo Male with PMH of DM FOR 10 years and 2 coronary stents placed (9 years and 3 months ago ) c/o generalized weakness and pain after having dry cough for 2 weeks.  Patient was admitted for ruling out ACS.       *Incomplete notes Patient is a 60 yo Male with PMH of DM FOR 10 years and 2 coronary stents placed (9 years and 3 months ago ) c/o generalized weakness, subjective fever,  and generalized body pain after having dry cough for 2 weeks.  Patient was admitted for ruling out ACS.

## 2019-07-23 NOTE — DISCHARGE NOTE PROVIDER - HOSPITAL COURSE
58 yo Male with PMH of DM FOR 10 years and 2 coronary stents placed (9 years and 3 months ago ) c/o generalized weakness and pain after having dry cough.  Acute coronary syndrome was ruled out - 3 sets of cardiac enzymes were done and noted negative . He was treated with aspirin, statin and beta blocker. Cardiologist was consulted. No EKG changes noted for ischemia. ECHO result was pending. Stress test was negative for ischemia. Given patient's improved clinical status and current hemodynamic stability, decision was made to discharge. Discussed with attending    Please refer to patient's complete medical chart with documents for a full hospital course, for this is only a brief summary.

## 2019-07-23 NOTE — CONSULT NOTE ADULT - SUBJECTIVE AND OBJECTIVE BOX
CHIEF COMPLAINT: Chest pain    HPI: Patient is a 60 yo Male with PMH of DM FOR 10 years and 2 coronary stents placed (9 years and 3 months ago ) c/o generalized weakness and pain after having dry cough for 2 weeks .decreased apatite for 1 week. His pcp prescribed him antibiotics and as he finished his Abx his cough came back and on friday he went back to his PCP. His PCP recommendeded  going to ER if he feels any worse.       PAST MEDICAL & SURGICAL HISTORY:  Stented coronary artery: one 9 years ago and other 3 months ago  CAD (coronary artery disease): 2 stents  HTN (hypertension): 5 years ago  DM (diabetes mellitus): for 10 years  No significant past surgical history      Allergies    No Known Allergies    Intolerances        MEDICATIONS  (STANDING):  atorvastatin 40 milliGRAM(s) Oral at bedtime  dextrose 5%. 1000 milliLiter(s) (50 mL/Hr) IV Continuous <Continuous>  dextrose 50% Injectable 12.5 Gram(s) IV Push once  dextrose 50% Injectable 25 Gram(s) IV Push once  dextrose 50% Injectable 25 Gram(s) IV Push once  enoxaparin Injectable 40 milliGRAM(s) SubCutaneous daily  insulin lispro (HumaLOG) corrective regimen sliding scale   SubCutaneous Before meals and at bedtime  magnesium sulfate  IVPB 1 Gram(s) IV Intermittent once  metoprolol succinate ER 12.5 milliGRAM(s) Oral daily    MEDICATIONS  (PRN):  acetaminophen   Tablet .. 650 milliGRAM(s) Oral every 6 hours PRN Mild Pain (1 - 3)  dextrose 40% Gel 15 Gram(s) Oral once PRN Blood Glucose LESS THAN 70 milliGRAM(s)/deciliter  glucagon  Injectable 1 milliGRAM(s) IntraMuscular once PRN Glucose LESS THAN 70 milligrams/deciliter  oxyCODONE    5 mG/acetaminophen 325 mG 1 Tablet(s) Oral every 6 hours PRN Moderate Pain (4 - 6)      FAMILY HISTORY:    No family history of premature coronary artery disease or sudden cardiac death    SOCIAL HISTORY:  Smoking-  Alcohol-  Illicit Drug use-    REVIEW OF SYSTEMS:  Constitutional: [ ] fever, [ ]weight loss,  [ ]fatigue  Eyes: [ ] visual changes  Respiratory: [ ]shortness of breath;  [ ] cough, [ ]wheezing, [ ]chills, [ ]hemoptysis  Cardiovascular: [ ] chest pain, [ ]palpitations, [ ]dizziness,  [ ]leg swelling [ ]syncope  Gastrointestinal: [ ] abdominal pain, [ ]nausea, [ ]vomiting,  [ ]diarrhea   Genitourinary: [ ] dysuria, [ ] hematuria  Neurologic: [ ] headaches [ ] tremors  [ ] weakness [ ] lightheadedness  Skin: [ ] itching, [ ]burning, [ ] rashes  Endocrine: [ ] heat or cold intolerance  Musculoskeletal: [ ] joint pain or swelling; [ ] muscle, back, or extremity pain  Psychiatric: [ ] depression, [ ]anxiety, [ ]mood swings, or [ ]difficulty sleeping  Hematologic: [ ] easy bruising, [ ] bleeding gums       [ x] All others negative	  [ ] Unable to obtain    Vital Signs Last 24 Hrs  T(C): 36.8 (23 Jul 2019 08:01), Max: 37.1 (22 Jul 2019 20:22)  T(F): 98.2 (23 Jul 2019 08:01), Max: 98.7 (22 Jul 2019 20:22)  HR: 78 (23 Jul 2019 08:01) (67 - 92)  BP: 133/85 (23 Jul 2019 08:01) (126/79 - 134/86)  BP(mean): --  RR: 18 (23 Jul 2019 08:01) (16 - 20)  SpO2: 100% (23 Jul 2019 08:01) (97% - 100%)  I&O's Summary    22 Jul 2019 07:01  -  23 Jul 2019 07:00  --------------------------------------------------------  IN: 75 mL / OUT: 400 mL / NET: -325 mL        PHYSICAL EXAM:  General: No acute distress  HEENT: EOMI, PERRL  Neck: Supple, No JVD  Lungs: Clear to auscultation bilaterally; No rales or wheezing  Heart: Regular rate and rhythm; No murmurs, rubs, or gallops  Abdomen: Nontender, bowel sounds present  Extremities: No clubbing, cyanosis, or edema  Nervous system:  Alert & Oriented X3, no focal deficits  Psychiatric: Normal affect  Skin: No rashes or lesions      LABS:  07-23    135  |  102  |  14  ----------------------------<  180<H>  4.0   |  26  |  0.88    Ca    8.3<L>      23 Jul 2019 08:21  Phos  4.5     07-23  Mg     1.8     07-23    TPro  7.3  /  Alb  3.7  /  TBili  0.8  /  DBili  x   /  AST  13  /  ALT  21  /  AlkPhos  46  07-23    Creatinine Trend: 0.88<--, 0.82<--, 1.04<--                        13.5   6.29  )-----------( 203      ( 23 Jul 2019 08:21 )             40.0     PT/INR - ( 22 Jul 2019 13:10 )   PT: 11.3 sec;   INR: 1.02 ratio         PTT - ( 22 Jul 2019 13:10 )  PTT:29.9 sec    Lipid Panel: Cholesterol, Serum 89  Direct LDL 7  HDL Cholesterol, Serum 36  Triglycerides, Serum 228    Cardiac Enzymes: CARDIAC MARKERS ( 23 Jul 2019 01:11 )  <0.015 ng/mL / x     / 228 U/L / x     / 2.6 ng/mL  CARDIAC MARKERS ( 22 Jul 2019 19:16 )  <0.015 ng/mL / x     / 274 U/L / x     / 3.5 ng/mL  CARDIAC MARKERS ( 22 Jul 2019 13:10 )  <0.015 ng/mL / x     / 276 U/L / x     / x            07-23 [id number] 8.4      RADIOLOGY:    ECG [my interpretation]:    TELEMETRY: No significant events on telemetry     STRESS TEST:    CATHETERIZATION: CHIEF COMPLAINT: Generalized weakness and body ache    HPI: Patient is a 58 yo Male with PMH of DM FOR 10 years and 2 coronary stents placed (9 years and 3 months ago ) c/o generalized weakness and body pain after having dry cough for 2 weeks ,decreased apatite for 1 week. His pcp prescribed him antibiotics and as he finished his Abx his cough came back and on friday he went back to his PCP. He denied chest pain, palpitation and other review of systems. Pt feels better now and has normal appetite.     PAST MEDICAL & SURGICAL HISTORY:  Stented coronary artery: one 9 years ago and other 3 months ago  CAD (coronary artery disease): 2 stents  HTN (hypertension): 5 years ago  DM (diabetes mellitus): for 10 years  No significant past surgical history      Allergies    No Known Allergies    Intolerances        MEDICATIONS  (STANDING):  atorvastatin 40 milliGRAM(s) Oral at bedtime  dextrose 5%. 1000 milliLiter(s) (50 mL/Hr) IV Continuous <Continuous>  dextrose 50% Injectable 12.5 Gram(s) IV Push once  dextrose 50% Injectable 25 Gram(s) IV Push once  dextrose 50% Injectable 25 Gram(s) IV Push once  enoxaparin Injectable 40 milliGRAM(s) SubCutaneous daily  insulin lispro (HumaLOG) corrective regimen sliding scale   SubCutaneous Before meals and at bedtime  magnesium sulfate  IVPB 1 Gram(s) IV Intermittent once  metoprolol succinate ER 12.5 milliGRAM(s) Oral daily    MEDICATIONS  (PRN):  acetaminophen   Tablet .. 650 milliGRAM(s) Oral every 6 hours PRN Mild Pain (1 - 3)  dextrose 40% Gel 15 Gram(s) Oral once PRN Blood Glucose LESS THAN 70 milliGRAM(s)/deciliter  glucagon  Injectable 1 milliGRAM(s) IntraMuscular once PRN Glucose LESS THAN 70 milligrams/deciliter  oxyCODONE    5 mG/acetaminophen 325 mG 1 Tablet(s) Oral every 6 hours PRN Moderate Pain (4 - 6)      FAMILY HISTORY:    No family history of premature coronary artery disease or sudden cardiac death    SOCIAL HISTORY:  Smoking-  Alcohol-  Illicit Drug use-    REVIEW OF SYSTEMS:  Constitutional: [x ] fever, [ ]weight loss,  [ ]fatigue  Eyes: [ ] visual changes  Respiratory: [ ]shortness of breath;  [ ] cough, [ ]wheezing, [ ]chills, [ ]hemoptysis  Cardiovascular: [ ] chest pain, [ ]palpitations, [ ]dizziness,  [ ]leg swelling [ ]syncope  Gastrointestinal: [ ] abdominal pain, [ ]nausea, [ ]vomiting,  [ ]diarrhea   Genitourinary: [ ] dysuria, [ ] hematuria  Neurologic: [ ] headaches [ ] tremors  [ ] weakness [ ] lightheadedness  Skin: [ ] itching, [ ]burning, [ ] rashes  Endocrine: [ ] heat or cold intolerance  Musculoskeletal: [ ] joint pain or swelling; [ ] muscle, back, or extremity pain  Psychiatric: [ ] depression, [ ]anxiety, [ ]mood swings, or [ ]difficulty sleeping  Hematologic: [ ] easy bruising, [ ] bleeding gums       [ x] All others negative	      Vital Signs Last 24 Hrs  T(C): 36.8 (23 Jul 2019 08:01), Max: 37.1 (22 Jul 2019 20:22)  T(F): 98.2 (23 Jul 2019 08:01), Max: 98.7 (22 Jul 2019 20:22)  HR: 78 (23 Jul 2019 08:01) (67 - 92)  BP: 133/85 (23 Jul 2019 08:01) (126/79 - 134/86)  BP(mean): --  RR: 18 (23 Jul 2019 08:01) (16 - 20)  SpO2: 100% (23 Jul 2019 08:01) (97% - 100%)  I&O's Summary    22 Jul 2019 07:01  -  23 Jul 2019 07:00  --------------------------------------------------------  IN: 75 mL / OUT: 400 mL / NET: -325 mL        PHYSICAL EXAM:  General: No acute distress  HEENT: EOMI, PERRL  Neck: Supple, No JVD  Lungs: Clear to auscultation bilaterally; No rales or wheezing  Heart: Regular rate and rhythm; No murmurs, rubs, or gallops  Abdomen: Nontender, bowel sounds present  Extremities: No clubbing, cyanosis, or edema  Nervous system:  Alert & Oriented X3, no focal deficits  Psychiatric: Normal affect  Skin: No rashes or lesions      LABS:  07-23    135  |  102  |  14  ----------------------------<  180<H>  4.0   |  26  |  0.88    Ca    8.3<L>      23 Jul 2019 08:21  Phos  4.5     07-23  Mg     1.8     07-23    TPro  7.3  /  Alb  3.7  /  TBili  0.8  /  DBili  x   /  AST  13  /  ALT  21  /  AlkPhos  46  07-23    Creatinine Trend: 0.88<--, 0.82<--, 1.04<--                        13.5   6.29  )-----------( 203      ( 23 Jul 2019 08:21 )             40.0     PT/INR - ( 22 Jul 2019 13:10 )   PT: 11.3 sec;   INR: 1.02 ratio         PTT - ( 22 Jul 2019 13:10 )  PTT:29.9 sec    Lipid Panel: Cholesterol, Serum 89  Direct LDL 7  HDL Cholesterol, Serum 36  Triglycerides, Serum 228    Cardiac Enzymes: CARDIAC MARKERS ( 23 Jul 2019 01:11 )  <0.015 ng/mL / x     / 228 U/L / x     / 2.6 ng/mL  CARDIAC MARKERS ( 22 Jul 2019 19:16 )  <0.015 ng/mL / x     / 274 U/L / x     / 3.5 ng/mL  CARDIAC MARKERS ( 22 Jul 2019 13:10 )  <0.015 ng/mL / x     / 276 U/L / x     / x            07-23 [id number] 8.4      RADIOLOGY:    ECG [my interpretation]:    TELEMETRY: No significant events on telemetry     STRESS TEST:

## 2019-07-23 NOTE — PROGRESS NOTE ADULT - PROBLEM SELECTOR PLAN 1
Troponins negative  - c/w ASA 81 mg, beta blocker and High dose statin   f/u ECHO  F/U STRESS TEST  Dr Curry  - ((No coffee or tea since midnight))  - Stress Test at AM   - On Tele.  ** Cardiologist Consulted Dr. Curry

## 2019-07-23 NOTE — CONSULT NOTE ADULT - PROBLEM SELECTOR RECOMMENDATION 2
Pt is on metoprolol. He mentioned that he was kept off ACE inhibitor because of low blood pressure.  Blood pressure is better controlled now.  Continue home meds.

## 2019-07-23 NOTE — PROGRESS NOTE ADULT - SUBJECTIVE AND OBJECTIVE BOX
PGY 1 Note discussed with supervising resident and primary attending    Patient is a 59y old  Male who presents with a chief complaint of weakness and generalized pain. (2019 17:55)      INTERVAL HPI/OVERNIGHT EVENTS: .    MEDICATIONS  (STANDING):  atorvastatin 40 milliGRAM(s) Oral at bedtime  dextrose 5%. 1000 milliLiter(s) (50 mL/Hr) IV Continuous <Continuous>  dextrose 50% Injectable 12.5 Gram(s) IV Push once  dextrose 50% Injectable 25 Gram(s) IV Push once  dextrose 50% Injectable 25 Gram(s) IV Push once  enoxaparin Injectable 40 milliGRAM(s) SubCutaneous daily  insulin lispro (HumaLOG) corrective regimen sliding scale   SubCutaneous Before meals and at bedtime  magnesium sulfate  IVPB 1 Gram(s) IV Intermittent once  metoprolol succinate ER 12.5 milliGRAM(s) Oral daily  sodium chloride 0.9%. 1000 milliLiter(s) (125 mL/Hr) IV Continuous <Continuous>  sodium chloride 0.9%. 1000 milliLiter(s) (70 mL/Hr) IV Continuous <Continuous>    MEDICATIONS  (PRN):  acetaminophen   Tablet .. 650 milliGRAM(s) Oral every 6 hours PRN Mild Pain (1 - 3)  dextrose 40% Gel 15 Gram(s) Oral once PRN Blood Glucose LESS THAN 70 milliGRAM(s)/deciliter  glucagon  Injectable 1 milliGRAM(s) IntraMuscular once PRN Glucose LESS THAN 70 milligrams/deciliter  oxyCODONE    5 mG/acetaminophen 325 mG 1 Tablet(s) Oral every 6 hours PRN Moderate Pain (4 - 6)      __________________________________________________  REVIEW OF SYSTEMS:    CONSTITUTIONAL: No fever,   EYES: no acute visual disturbances  NECK: No pain or stiffness  RESPIRATORY: No cough; No shortness of breath  CARDIOVASCULAR: No chest pain, no palpitations  GASTROINTESTINAL: No pain. No nausea or vomiting; No diarrhea   NEUROLOGICAL: No headache or numbness, no tremors  MUSCULOSKELETAL: No joint pain, no muscle pain  GENITOURINARY: no dysuria, no frequency, no hesitancy  PSYCHIATRY: no depression , no anxiety  ALL OTHER  ROS negative        Vital Signs Last 24 Hrs  T(C): 36.8 (2019 08:01), Max: 37.1 (2019 20:22)  T(F): 98.2 (2019 08:01), Max: 98.7 (2019 20:22)  HR: 78 (2019 08:01) (67 - 92)  BP: 133/85 (2019 08:01) (126/79 - 134/86)  BP(mean): --  RR: 18 (2019 08:01) (16 - 20)  SpO2: 100% (2019 08:01) (97% - 100%)    ________________________________________________  PHYSICAL EXAM:  GENERAL: NAD  HEENT: Normocephalic;  conjunctivae and sclerae clear; moist mucous membranes;   NECK : supple  CHEST/LUNG: Clear to auscultation bilaterally with good air entry   HEART: S1 S2  regular; no murmurs, gallops or rubs  ABDOMEN: Soft, Nontender, Nondistended; Bowel sounds present  EXTREMITIES: no cyanosis; no edema; no calf tenderness  SKIN: warm and dry; no rash  NERVOUS SYSTEM:  Awake and alert; Oriented  to place, person and time ; no new deficits    _________________________________________________  LABS:                        13.5   6.29  )-----------( 203      ( 2019 08:21 )             40.0         135  |  102  |  14  ----------------------------<  180<H>  4.0   |  26  |  0.88    Ca    8.3<L>      2019 08:21  Phos  4.5       Mg     1.8         TPro  7.3  /  Alb  3.7  /  TBili  0.8  /  DBili  x   /  AST  13  /  ALT  21  /  AlkPhos  46  23    PT/INR - ( 2019 13:10 )   PT: 11.3 sec;   INR: 1.02 ratio         PTT - ( 2019 13:10 )  PTT:29.9 sec  Urinalysis Basic - ( 2019 15:19 )    Color: Yellow / Appearance: Clear / S.005 / pH: x  Gluc: x / Ketone: Negative  / Bili: Negative / Urobili: Negative   Blood: x / Protein: 15 / Nitrite: Negative   Leuk Esterase: Small / RBC: x / WBC 0-2 /HPF   Sq Epi: x / Non Sq Epi: Moderate /HPF / Bacteria: x      CAPILLARY BLOOD GLUCOSE      POCT Blood Glucose.: 194 mg/dL (2019 07:45)  POCT Blood Glucose.: 179 mg/dL (2019 23:05)  POCT Blood Glucose.: 218 mg/dL (2019 21:45)  POCT Blood Glucose.: 240 mg/dL (2019 11:33)

## 2019-07-23 NOTE — DISCHARGE NOTE PROVIDER - NSDCCPCAREPLAN_GEN_ALL_CORE_FT
PRINCIPAL DISCHARGE DIAGNOSIS  Diagnosis: ACS (acute coronary syndrome)  Assessment and Plan of Treatment: You came here with chest pain. Acute coronary syndrome was ruled out - 3 sets of cardiac enzymes were done and noted negative. You were admitted to telemetry and monitored your heart. You were treated with aspirin, statin and beta blocker. Cardiologist was consulted. No EKG changes noted for ischemia. ECHO result was pending. Stress test was negative for blockage of blood vessel supplying your heart.. Follow up with your Primary medical doctor in 2 weeks of discharge.      SECONDARY DISCHARGE DIAGNOSES  Diagnosis: Type 2 diabetes mellitus with complication, unspecified whether long term insulin use  Assessment and Plan of Treatment: You have high blood sugar. Please continue the current medication regimen. Follow up with your primary medical doctor for Hba1c level measurement. Your sugars are well controlled. You were given education about diabetes. Please carry with you a candy, eat it whenever you feel dizzy, sweating a lot and weak that may be due to low blood glucose. Please take the medications regularly and go to appointments regularly.  Outpatient follow up with ophthalmologist due to possible diabetic retinopathy.  Please follow up with your PCp within 1-2 weeks of discharge from the hospital for continued care. PRINCIPAL DISCHARGE DIAGNOSIS  Diagnosis: ACS (acute coronary syndrome)  Assessment and Plan of Treatment: You came here with chest pain. Acute coronary syndrome was ruled out - 3 sets of cardiac enzymes were done and noted negative. You were admitted to telemetry and monitored your heart. You were treated with aspirin, statin and beta blocker. Cardiologist was consulted. No EKG changes noted for ischemia. ECHO result was pending. Stress test was negative for blockage of blood vessel supplying your heart.. Follow up with your Primary medical doctor in 2 weeks of discharge.      SECONDARY DISCHARGE DIAGNOSES  Diagnosis: Type 2 diabetes mellitus with complication, unspecified whether long term insulin use  Assessment and Plan of Treatment: You have high blood sugar. Your Hba1c is 8.4. Please continue the current medication regimen of januvia and metformin. Follow up with your primary medical doctor for Hba1c level measurement. Your sugars are well controlled. You were given education about diabetes. Please carry with you a candy, eat it whenever you feel dizzy, sweating a lot and weak that may be due to low blood glucose. Please take the medications regularly and go to appointments regularly.  Outpatient follow up with ophthalmologist due to possible diabetic retinopathy.  Please follow up with your PCp within 1-2 weeks of discharge from the hospital for continued care.

## 2019-07-23 NOTE — PROGRESS NOTE ADULT - PROBLEM SELECTOR PLAN 5
IMPROVE VTE Individual Risk Assessment    RISK                                                                Points    [  ] Previous VTE                                                  3    [  ] Thrombophilia                                               2    [  ] Lower limb paralysis                                      2        (unable to hold up >15 seconds)      [  ] Current Cancer                                              2         (within 6 months)  [X] Immobilization > 24 hrs                                1  [  ] ICU/CCU stay > 24 hours                              1    [X] Age > 60                                                      1    IMPROVE VTE Score _____2____  c/w  lovenox

## 2019-07-27 LAB
CULTURE RESULTS: SIGNIFICANT CHANGE UP
CULTURE RESULTS: SIGNIFICANT CHANGE UP
SPECIMEN SOURCE: SIGNIFICANT CHANGE UP
SPECIMEN SOURCE: SIGNIFICANT CHANGE UP

## 2019-08-01 ENCOUNTER — OUTPATIENT (OUTPATIENT)
Dept: OUTPATIENT SERVICES | Facility: HOSPITAL | Age: 59
LOS: 1 days | End: 2019-08-01
Payer: MEDICAID

## 2019-08-01 PROCEDURE — G9001: CPT

## 2019-08-13 DIAGNOSIS — Z71.89 OTHER SPECIFIED COUNSELING: ICD-10-CM

## 2019-08-13 PROBLEM — Z95.5 PRESENCE OF CORONARY ANGIOPLASTY IMPLANT AND GRAFT: Chronic | Status: ACTIVE | Noted: 2019-07-22

## 2019-08-13 PROBLEM — I10 ESSENTIAL (PRIMARY) HYPERTENSION: Chronic | Status: ACTIVE | Noted: 2019-07-22

## 2019-08-13 PROBLEM — I25.10 ATHEROSCLEROTIC HEART DISEASE OF NATIVE CORONARY ARTERY WITHOUT ANGINA PECTORIS: Chronic | Status: ACTIVE | Noted: 2019-07-22

## 2019-08-13 PROBLEM — E11.9 TYPE 2 DIABETES MELLITUS WITHOUT COMPLICATIONS: Chronic | Status: ACTIVE | Noted: 2019-07-22

## 2021-03-03 ENCOUNTER — EMERGENCY (EMERGENCY)
Facility: HOSPITAL | Age: 61
LOS: 1 days | Discharge: HOME | End: 2021-03-03
Attending: EMERGENCY MEDICINE
Payer: MEDICAID

## 2021-03-03 VITALS
DIASTOLIC BLOOD PRESSURE: 67 MMHG | OXYGEN SATURATION: 99 % | HEART RATE: 70 BPM | SYSTOLIC BLOOD PRESSURE: 144 MMHG | RESPIRATION RATE: 19 BRPM | TEMPERATURE: 98 F

## 2021-03-03 VITALS
DIASTOLIC BLOOD PRESSURE: 90 MMHG | HEIGHT: 74 IN | SYSTOLIC BLOOD PRESSURE: 160 MMHG | HEART RATE: 75 BPM | WEIGHT: 182.1 LBS | OXYGEN SATURATION: 100 % | TEMPERATURE: 98 F | RESPIRATION RATE: 18 BRPM

## 2021-03-03 LAB
ALBUMIN SERPL ELPH-MCNC: 4 G/DL — SIGNIFICANT CHANGE UP (ref 3.5–5)
ALP SERPL-CCNC: 46 U/L — SIGNIFICANT CHANGE UP (ref 40–120)
ALT FLD-CCNC: 29 U/L DA — SIGNIFICANT CHANGE UP (ref 10–60)
ANION GAP SERPL CALC-SCNC: 8 MMOL/L — SIGNIFICANT CHANGE UP (ref 5–17)
APPEARANCE UR: ABNORMAL
AST SERPL-CCNC: 22 U/L — SIGNIFICANT CHANGE UP (ref 10–40)
BACTERIA # UR AUTO: ABNORMAL /HPF
BASOPHILS # BLD AUTO: 0.04 K/UL — SIGNIFICANT CHANGE UP (ref 0–0.2)
BASOPHILS NFR BLD AUTO: 0.5 % — SIGNIFICANT CHANGE UP (ref 0–2)
BILIRUB SERPL-MCNC: 0.7 MG/DL — SIGNIFICANT CHANGE UP (ref 0.2–1.2)
BILIRUB UR-MCNC: NEGATIVE — SIGNIFICANT CHANGE UP
BUN SERPL-MCNC: 18 MG/DL — SIGNIFICANT CHANGE UP (ref 7–18)
CALCIUM SERPL-MCNC: 8.7 MG/DL — SIGNIFICANT CHANGE UP (ref 8.4–10.5)
CHLORIDE SERPL-SCNC: 104 MMOL/L — SIGNIFICANT CHANGE UP (ref 96–108)
CO2 SERPL-SCNC: 25 MMOL/L — SIGNIFICANT CHANGE UP (ref 22–31)
COLOR SPEC: ABNORMAL
CREAT SERPL-MCNC: 1.07 MG/DL — SIGNIFICANT CHANGE UP (ref 0.5–1.3)
DIFF PNL FLD: ABNORMAL
EOSINOPHIL # BLD AUTO: 0.55 K/UL — HIGH (ref 0–0.5)
EOSINOPHIL NFR BLD AUTO: 6.7 % — HIGH (ref 0–6)
EPI CELLS # UR: SIGNIFICANT CHANGE UP /HPF
GLUCOSE SERPL-MCNC: 112 MG/DL — HIGH (ref 70–99)
GLUCOSE UR QL: NEGATIVE — SIGNIFICANT CHANGE UP
HCT VFR BLD CALC: 37.4 % — LOW (ref 39–50)
HGB BLD-MCNC: 12.6 G/DL — LOW (ref 13–17)
IMM GRANULOCYTES NFR BLD AUTO: 0.1 % — SIGNIFICANT CHANGE UP (ref 0–1.5)
KETONES UR-MCNC: ABNORMAL
LEUKOCYTE ESTERASE UR-ACNC: ABNORMAL
LIDOCAIN IGE QN: 132 U/L — SIGNIFICANT CHANGE UP (ref 73–393)
LYMPHOCYTES # BLD AUTO: 1.89 K/UL — SIGNIFICANT CHANGE UP (ref 1–3.3)
LYMPHOCYTES # BLD AUTO: 22.9 % — SIGNIFICANT CHANGE UP (ref 13–44)
MCHC RBC-ENTMCNC: 29.4 PG — SIGNIFICANT CHANGE UP (ref 27–34)
MCHC RBC-ENTMCNC: 33.7 GM/DL — SIGNIFICANT CHANGE UP (ref 32–36)
MCV RBC AUTO: 87.4 FL — SIGNIFICANT CHANGE UP (ref 80–100)
MONOCYTES # BLD AUTO: 0.64 K/UL — SIGNIFICANT CHANGE UP (ref 0–0.9)
MONOCYTES NFR BLD AUTO: 7.7 % — SIGNIFICANT CHANGE UP (ref 2–14)
NEUTROPHILS # BLD AUTO: 5.13 K/UL — SIGNIFICANT CHANGE UP (ref 1.8–7.4)
NEUTROPHILS NFR BLD AUTO: 62.1 % — SIGNIFICANT CHANGE UP (ref 43–77)
NITRITE UR-MCNC: NEGATIVE — SIGNIFICANT CHANGE UP
NRBC # BLD: 0 /100 WBCS — SIGNIFICANT CHANGE UP (ref 0–0)
PH UR: 5 — SIGNIFICANT CHANGE UP (ref 5–8)
PLATELET # BLD AUTO: 184 K/UL — SIGNIFICANT CHANGE UP (ref 150–400)
POTASSIUM SERPL-MCNC: 4.1 MMOL/L — SIGNIFICANT CHANGE UP (ref 3.5–5.3)
POTASSIUM SERPL-SCNC: 4.1 MMOL/L — SIGNIFICANT CHANGE UP (ref 3.5–5.3)
PROT SERPL-MCNC: 7.1 G/DL — SIGNIFICANT CHANGE UP (ref 6–8.3)
PROT UR-MCNC: 100
RBC # BLD: 4.28 M/UL — SIGNIFICANT CHANGE UP (ref 4.2–5.8)
RBC # FLD: 12.2 % — SIGNIFICANT CHANGE UP (ref 10.3–14.5)
RBC CASTS # UR COMP ASSIST: >50 /HPF (ref 0–2)
SODIUM SERPL-SCNC: 137 MMOL/L — SIGNIFICANT CHANGE UP (ref 135–145)
SP GR SPEC: 1.01 — SIGNIFICANT CHANGE UP (ref 1.01–1.02)
UROBILINOGEN FLD QL: NEGATIVE — SIGNIFICANT CHANGE UP
WBC # BLD: 8.26 K/UL — SIGNIFICANT CHANGE UP (ref 3.8–10.5)
WBC # FLD AUTO: 8.26 K/UL — SIGNIFICANT CHANGE UP (ref 3.8–10.5)
WBC UR QL: ABNORMAL /HPF (ref 0–5)

## 2021-03-03 PROCEDURE — 74176 CT ABD & PELVIS W/O CONTRAST: CPT | Mod: 26,MA

## 2021-03-03 PROCEDURE — 74176 CT ABD & PELVIS W/O CONTRAST: CPT

## 2021-03-03 PROCEDURE — 80053 COMPREHEN METABOLIC PANEL: CPT

## 2021-03-03 PROCEDURE — 36415 COLL VENOUS BLD VENIPUNCTURE: CPT

## 2021-03-03 PROCEDURE — 99285 EMERGENCY DEPT VISIT HI MDM: CPT

## 2021-03-03 PROCEDURE — 81001 URINALYSIS AUTO W/SCOPE: CPT

## 2021-03-03 PROCEDURE — 83690 ASSAY OF LIPASE: CPT

## 2021-03-03 PROCEDURE — 99284 EMERGENCY DEPT VISIT MOD MDM: CPT | Mod: 25

## 2021-03-03 PROCEDURE — 87086 URINE CULTURE/COLONY COUNT: CPT

## 2021-03-03 PROCEDURE — 96374 THER/PROPH/DIAG INJ IV PUSH: CPT

## 2021-03-03 PROCEDURE — 85025 COMPLETE CBC W/AUTO DIFF WBC: CPT

## 2021-03-03 RX ORDER — KETOROLAC TROMETHAMINE 30 MG/ML
15 SYRINGE (ML) INJECTION ONCE
Refills: 0 | Status: DISCONTINUED | OUTPATIENT
Start: 2021-03-03 | End: 2021-03-03

## 2021-03-03 RX ORDER — PHENAZOPYRIDINE HCL 100 MG
200 TABLET ORAL ONCE
Refills: 0 | Status: COMPLETED | OUTPATIENT
Start: 2021-03-03 | End: 2021-03-03

## 2021-03-03 RX ORDER — CEFUROXIME AXETIL 250 MG
250 TABLET ORAL ONCE
Refills: 0 | Status: COMPLETED | OUTPATIENT
Start: 2021-03-03 | End: 2021-03-03

## 2021-03-03 RX ORDER — SODIUM CHLORIDE 9 MG/ML
1000 INJECTION INTRAMUSCULAR; INTRAVENOUS; SUBCUTANEOUS ONCE
Refills: 0 | Status: COMPLETED | OUTPATIENT
Start: 2021-03-03 | End: 2021-03-03

## 2021-03-03 RX ORDER — PHENAZOPYRIDINE HCL 100 MG
1 TABLET ORAL
Qty: 6 | Refills: 0
Start: 2021-03-03 | End: 2021-03-05

## 2021-03-03 RX ORDER — CEFUROXIME AXETIL 250 MG
1 TABLET ORAL
Qty: 14 | Refills: 0
Start: 2021-03-03 | End: 2021-03-09

## 2021-03-03 RX ADMIN — SODIUM CHLORIDE 1000 MILLILITER(S): 9 INJECTION INTRAMUSCULAR; INTRAVENOUS; SUBCUTANEOUS at 20:58

## 2021-03-03 RX ADMIN — Medication 250 MILLIGRAM(S): at 23:30

## 2021-03-03 RX ADMIN — Medication 200 MILLIGRAM(S): at 23:30

## 2021-03-03 RX ADMIN — Medication 15 MILLIGRAM(S): at 20:58

## 2021-03-03 NOTE — ED PROVIDER NOTE - NSFOLLOWUPCLINICS_GEN_ALL_ED_FT
[facility]  Urology  [address] [place] [address]  Phone: [phone]  Fax: [phone]  Follow Up Time: 1-3 Days

## 2021-03-03 NOTE — ED PROVIDER NOTE - PROGRESS NOTE DETAILS
Pt with uti, afebrile, well appearing, tolerating po, will dc to follow up with urologist. Precautions reviewed.

## 2021-03-03 NOTE — ED PROVIDER NOTE - PMH
CAD (coronary artery disease)  2 stents  DM (diabetes mellitus)  for 10 years  HTN (hypertension)  5 years ago  Stented coronary artery  one 9 years ago and other 3 months ago

## 2021-03-03 NOTE — ED PROVIDER NOTE - PATIENT PORTAL LINK FT
You can access the FollowMyHealth Patient Portal offered by [facility] by registering at the following website: [email or web address]. By joining [employer]’s FollowMyHealth portal, you will also be able to view your health information using other applications (apps) compatible with our system.

## 2021-03-04 LAB
CULTURE RESULTS: SIGNIFICANT CHANGE UP
SPECIMEN SOURCE: SIGNIFICANT CHANGE UP

## 2022-06-20 NOTE — ED PROVIDER NOTE - CROS ED GU ALL NEG
Chart review completed for the following sections:   Recent Vital Signs   Allergies/Contradictions   Medication Review    History    [facility]    Problem List   Immunizations   Past hospitalizations and major procedures, including surgery   Significant past illnesses and treatment history including:    Relevant past medications related to the patient's condition    Chart reviewed with RN CM and pt is not eligible for [facility] as he does not meet criteria 
- - -

## 2023-04-11 NOTE — ED ADULT NURSE NOTE - NSFALLRSKASSESSTYPE_ED_ALL_ED
Subjective   Patient ID: Marcos Thompson is a 73 y.o. male who presents for Blood Pressure Check.    Objective   /78   I spoke with him.  He is here today for a nurse visit to recheck blood pressure.  He is currently taking amlodipine 5 mg daily and metoprolol 100 mg daily for hypertension.  He has had difficulty tolerating multiple antihypertensives in the past including losartan, valsartan and chlorthalidone.  At his last visit 1 month ago we had increased his amlodipine dose from 2.5 to 5 mg daily.  Blood pressure at that time was 167/76.  He states that he has noticed some muscle cramping since increasing the dose of amlodipine.  No leg edema or constipation.  His blood pressure at home has ranged from 1 24-1 the 140s to 150s.  He does mention that approximately 2 weeks ago he noticed a painful lump on his right buttocks.  No drainage or fever.  Pain has resolved but the lump is still there.  I did look at the area and he has a firm, dull erythematous, nontender nonfluctuant abscess without any surrounding cellulitis located on his right buttocks measuring 1 to 1.5 cm in diameter.  We discussed warm compresses and will treat with Bactrim.  Follow-up in 1 to 2 weeks if not resolved.   Since he has had muscle cramping since increasing dose of amlodipine, we will hold off on increasing this dose today, and continue current medications.  Continue to monitor blood pressure out of the office and we will recheck at follow-up in 1 month     Initial (On Arrival)

## 2024-01-10 ENCOUNTER — EMERGENCY (EMERGENCY)
Facility: HOSPITAL | Age: 64
LOS: 1 days | Discharge: HOME | End: 2024-01-10
Attending: EMERGENCY MEDICINE
Payer: MEDICAID

## 2024-01-10 VITALS
HEART RATE: 91 BPM | DIASTOLIC BLOOD PRESSURE: 94 MMHG | RESPIRATION RATE: 20 BRPM | OXYGEN SATURATION: 98 % | SYSTOLIC BLOOD PRESSURE: 163 MMHG | TEMPERATURE: 99 F | HEIGHT: 74 IN | WEIGHT: 169.76 LBS

## 2024-01-10 PROCEDURE — 99284 EMERGENCY DEPT VISIT MOD MDM: CPT

## 2024-01-10 PROCEDURE — 99283 EMERGENCY DEPT VISIT LOW MDM: CPT | Mod: 25

## 2024-01-10 PROCEDURE — 71046 X-RAY EXAM CHEST 2 VIEWS: CPT

## 2024-01-10 PROCEDURE — 71046 X-RAY EXAM CHEST 2 VIEWS: CPT | Mod: 26

## 2024-01-10 NOTE — ED ADULT TRIAGE NOTE - ACCOMPANIED BY
Subjective:      Patient ID: Savannah Tijerina is a 38 y.o. female.    Chief Complaint:  No chief complaint on file.    History of Present Illness  Savannah Tijerina is here for follow up of Hyperthyroidism.  Previously seen by me 2/2022.  This is a MyChart video visit.    The patient location is: [place]  The chief complaint leading to consultation is: Thyroid  Visit type: Virtual visit with synchronous audio and video  Total time spent with patient: see below  Each patient to whom he or she provides medical services by telemedicine is:  (1) informed of the relationship between the physician and patient and the respective role of any other health care provider with respect to management of the patient; and (2) notified that he or she may decline to receive medical services by telemedicine and may withdraw from such care at any time.    With regards to hyperthyroidism:    Diagnosed with hyperthyroidism based on the labs from Dec, 2019 (in media): TSH: <0.005; FT4: 3.65    Diagnosed Graves disease 5/2020.     Ref. Range 5/1/2020 10:00   Thyrotropin Receptor Ab Latest Ref Range: 0.00 - 1.75 IU/L 2.72 (H)       Lab Results   Component Value Date    TSH 1.181 06/12/2023    M5FUTRA 167 09/30/2022    FREET4 0.87 06/12/2023     Thyroid US  2022  Impression:  Mildly enlarged thyroid gland with increased vascularity and diffuse heterogeneity of the background echotexture.  This is nonspecific but can be seen with thyroiditis.  Subcentimeter thyroid nodules as detailed above.  These nodules do not meet imaging criteria for recommendation for FNA biopsy or routine imaging follow-up.     Denies FH of thyroid disease or thyroid cancer.   Has three children   Denies plans for fertility.   Tubal ligation 2006  Reports regular menstrual cycles - every 26-30 days, lasts for about 5 days.     Current Symptoms:   Denies palpations  Denies unexplained weight changes  Denies Diarrhea  Denies Hair loss  Denies Brittle nails  Denies  "Skin changes  Denies Tremor   Denies Anxiety    Biotin Use: Denies    Current Medication:  Start Date 4/2020  Methimazole 2.5 mg every other day     Reports compliance.    Any recent (3-6 months) iodine or contrast: Denies    Smoke: Yes - 1/2 pack a day     Thyroid tenderness: Denies  Compressive symptoms: Denies     Graves Eye Clinical Activity: 3/7=Active  Eye pain? Denies  Eye pain with movement? Denies  Eyelid erythema? Denies  Erythema of conjunctiva? Denies  Caruncle inflammation? Denies  Eyelid swelling? Denies  Reports dry eyes.     Review of Systems  As above        There were no vitals taken for this visit.      There is no height or weight on file to calculate BMI.    Lab Review:   No results found for: "HGBA1C"    No results found for: "CHOL", "HDL", "LDLCALC", "TRIG", "CHOLHDL"  Lab Results   Component Value Date     11/30/2022    K 4.2 11/30/2022     11/30/2022    CO2 25 11/30/2022    GLU 94 11/30/2022    BUN 11 11/30/2022    CREATININE 0.7 11/30/2022    CALCIUM 9.1 11/30/2022    PROT 6.8 11/30/2022    ALBUMIN 3.8 11/30/2022    BILITOT 0.7 11/30/2022    ALKPHOS 42 (L) 11/30/2022    AST 12 11/30/2022    ALT 13 11/30/2022    ANIONGAP 7 (L) 11/30/2022    ESTGFRAFRICA >60.0 02/08/2022    EGFRNONAA >60.0 02/08/2022    TSH 1.181 06/12/2023     No results found for: "[id number]"  Assessment and Plan     1. Hyperthyroidism  Ambulatory referral/consult to Ophthalmology    Comprehensive Metabolic Panel    TSH    T4, Free    Thyrotropin Receptor Antibody    US Soft Tissue Head Neck Thyroid          Hyperthyroidism  -- Diagnosed with Graves disease 4/2020.  -- Reviewed possible options of methimazole, I131 therapy and surgery.  -- Graves' disease:   Extensive counseling today regarding the diagnosis of Graves' disease, the various options for treatment including thionamide, surgery, or I-131 therapy. We discussed the pros and cons of each treatment option, including contraindications to ALMEIDA if " Self considering pregnancy in next 6-12 months. Reviewed that with methimazole, the course is ~18 months of treatment and then evaluate for remission. Surgery is most definitive, I-131 takes 2-6 months. Would need lifelong thyroid hormone after surgery or I-131 Rx.   She is NOT considering pregnancy at this point but we reviewed that if this changes or if she becomes pregnant she must call office immediately because of the rare but established teratogenicity of MMI   Reviewed extra-systemic manifestations of Graves' including orbitopathy. Reviewed that cigarette smoking is a known risk factor for thyroid associated orbitopathy and advised to be watchful and avoid smoke.   -- Thionamide monitoring: I have reviewed the risk of agranulocytosis that can occur in 1 of 500 patients who are taking either PTU or methimazole. I have also reviewed the even rarer risk of hepatic dysfunction. A baseline CBC with differential and Liver function tests are available. The instruction sheet was given to the patient that describes these risks, warning symptoms, and instructions to stop the medication, contact the covering endocrinology fellow, and check blood tests.   -- Labs now.  -- Schedule thyroid US.  -- Discussed treatment options - patient prefers to continue with ATD therapy.  -- Continue:  Start Date 4/2020  Methimazole 2.5 mg every other day       Follow up in about 6 months (around 5/13/2024).    I spent 20 minutes face-to-face with the patient, over half of the visit was spent on counseling and/or coordinating the care of the patient.    Counseling includes:  Diagnostic results, impressions, recommendations   Prognosis   Risk and benefits of management/treatment options   Instructions for management treatment and or follow-up   Importance of compliance with management   Risk factor reduction   Patient education

## 2024-01-10 NOTE — ED ADULT NURSE NOTE - NSFALLUNIVINTERV_ED_ALL_ED
Bed/Stretcher in lowest position, wheels locked, appropriate side rails in place/Call bell, personal items and telephone in reach/Instruct patient to call for assistance before getting out of bed/chair/stretcher/Non-slip footwear applied when patient is off stretcher/[place] to call system/Physically safe environment - no spills, clutter or unnecessary equipment/Purposeful proactive rounding/Room/bathroom lighting operational, light cord in reach Bed/Stretcher in lowest position, wheels locked, appropriate side rails in place/Call bell, personal items and telephone in reach/Instruct patient to call for assistance before getting out of bed/chair/stretcher/Non-slip footwear applied when patient is off stretcher/[place] to call system/Physically safe environment - no spills, clutter or unnecessary equipment/Purposeful proactive rounding/Room/bathroom lighting operational, light cord in reach

## 2024-01-10 NOTE — ED PROVIDER NOTE - OBJECTIVE STATEMENT
63 yr old male with hx of CAD, diabetes, hypertension and PSHx of coronary artery stent placement presents to the ED c/o cough x 2 month on and off. pt tried z pack and promethazine. doesn't seem to do much. no fever, no sob, no new meds. has been on losartan over 2 yrs. no smoking. no leg swelling.

## 2024-01-10 NOTE — ED PROVIDER NOTE - PATIENT PORTAL LINK FT
You can access the FollowMyHealth Patient Portal offered by [facility] by registering at the following website: [email or web address]. By joining [employer]’s FollowMyHealth portal, you will also be able to view your health information using other applications (apps) compatible with our system. You can access the FollowMyHealth Patient Portal offered by [facility] by registering at the following website: [email or web address]. By joining [employer]’s FollowMyHealth portal, you will also be able to view your health information using other applications (apps) compatible with our system.

## 2024-01-10 NOTE — ED PROVIDER NOTE - CLINICAL SUMMARY MEDICAL DECISION MAKING FREE TEXT BOX
63 yr old male with hx of CAD, diabetes, hypertension and PSHx of coronary artery stent placement presents to the ED c/o cough x 2 month on and off. pt tried z pack and promethazine. doesn't seem to do much. no fever, no sob, no new meds. has been on losartan over 2 yrs. no smoking. no leg swelling.    r/o pna vs mass. no clinical sign of chf. hemodynamically stable. xr

## 2024-01-10 NOTE — ED ADULT NURSE NOTE - RESPIRATORY ASSESSMENT
Patient Seen in: [facility] Emergency Department    History   Patient presents with:  Chest Pain Angina (cardiovascular)    Stated Complaint: cp    HPI    The patient is a 15-year-old female who presents emergency room with a history of chest discom round and reactive to light. Oropharynx is clear. Mucous membranes are moist.  NECK: There is no focal tenderness to palpation appreciated. There is no JVD. No meningeal signs or nuchal rigidity appreciated. No stridor.   LUNGS: Clear to auscultation bilate ------                     CBC W/ DIFFERENTIAL[[id number]]                              Final result                 Please view results for these tests on the individual orders.    RAINBOW DRAW BLUE   RAINBOW DRAW LAVENDER   RAINBOW DRAW LIGHT GREEN   RAINB unspecified type    Disposition:  Admit  5/30/2019  2:20 pm    Follow-up:  No follow-up provider specified.       Medications Prescribed:  Current Discharge Medication List        Present on Admission  Date Reviewed: 3/20/2019          ICD-10-CM Noted POA - - -

## 2024-01-10 NOTE — ED PROVIDER NOTE - NSFOLLOWUPINSTRUCTIONS_ED_ALL_ED_FT
similar compare to prior. ectatic aorta. I do not think it is due to your medication losartan. monitor symptoms and see your Pulm MD

## 2024-04-23 NOTE — PATIENT PROFILE ADULT - DO YOU WANT TO COMPLETE THE HCP AND NAME A HEALTH CARE AGENT
4/23/2024      Edward Franklin  [address] [address]        Dear Edward,    Your health is very important to us. Our records show you are due for an appointment.    If you have another Primary Care Provider, please contact our office so we can update your records. Please contact our office at Dept: [phone] to schedule an appointment, or you may schedule using the [email or web address] antonio.     Sincerely,       Sujatha Damon PA-C   [facility] [address]  [address]  [place] [address]  Dept: [phone]  Dept Fax: [phone]         formerly [facility] offers online access to your health information via [email or web address]  
no

## 2024-05-13 NOTE — ED PROVIDER NOTE - OBJECTIVE STATEMENT
Goal Outcome Evaluation:         Went over the plan of care and answered all questions. Vitals stable and pain is well controlled. Patient is up with assistance and a cane and tolerating her diet. All medications given without complications and no other issues this shift.                                      61 year old male with PMHx of CAD, diabetes, hypertension and PSHx of coronary artery stent placement presents to the ED with complaints of noticing blood in his urine with associated left-sided flank pain and suprapubic pain since approximately 14:00 today. Patient additionally reports having dysuria, burning on urination, urinary frequency and urgency. Patient denies any fevers, chills, nausea, vomiting, and all other acute complaints. NKDA.

## 2024-09-28 ENCOUNTER — EMERGENCY (EMERGENCY)
Facility: HOSPITAL | Age: 64
LOS: 1 days | Discharge: HOME | End: 2024-09-28
Attending: EMERGENCY MEDICINE
Payer: COMMERCIAL

## 2024-09-28 VITALS
DIASTOLIC BLOOD PRESSURE: 94 MMHG | SYSTOLIC BLOOD PRESSURE: 145 MMHG | RESPIRATION RATE: 19 BRPM | HEART RATE: 79 BPM | WEIGHT: 167.99 LBS | TEMPERATURE: 98 F | OXYGEN SATURATION: 97 %

## 2024-09-28 VITALS
DIASTOLIC BLOOD PRESSURE: 70 MMHG | OXYGEN SATURATION: 97 % | RESPIRATION RATE: 18 BRPM | SYSTOLIC BLOOD PRESSURE: 116 MMHG | HEART RATE: 85 BPM | TEMPERATURE: 98 F

## 2024-09-28 LAB
ANION GAP SERPL CALC-SCNC: 8 MMOL/L — SIGNIFICANT CHANGE UP (ref 5–17)
APPEARANCE UR: CLEAR — SIGNIFICANT CHANGE UP
BACTERIA # UR AUTO: ABNORMAL /HPF
BASOPHILS # BLD AUTO: 0.05 K/UL — SIGNIFICANT CHANGE UP (ref 0–0.2)
BASOPHILS NFR BLD AUTO: 0.6 % — SIGNIFICANT CHANGE UP (ref 0–2)
BILIRUB UR-MCNC: NEGATIVE — SIGNIFICANT CHANGE UP
BUN SERPL-MCNC: 14 MG/DL — SIGNIFICANT CHANGE UP (ref 7–18)
CALCIUM SERPL-MCNC: 9.1 MG/DL — SIGNIFICANT CHANGE UP (ref 8.4–10.5)
CHLORIDE SERPL-SCNC: 93 MMOL/L — LOW (ref 96–108)
CO2 SERPL-SCNC: 27 MMOL/L — SIGNIFICANT CHANGE UP (ref 22–31)
COLOR SPEC: SIGNIFICANT CHANGE UP
CREAT SERPL-MCNC: 0.93 MG/DL — SIGNIFICANT CHANGE UP (ref 0.5–1.3)
DIFF PNL FLD: NEGATIVE — SIGNIFICANT CHANGE UP
EGFR: 91 ML/MIN/1.73M2 — SIGNIFICANT CHANGE UP
EOSINOPHIL # BLD AUTO: 0.9 K/UL — HIGH (ref 0–0.5)
EOSINOPHIL NFR BLD AUTO: 11.4 % — HIGH (ref 0–6)
EPI CELLS # UR: PRESENT
GLUCOSE SERPL-MCNC: 184 MG/DL — HIGH (ref 70–99)
GLUCOSE UR QL: >=1000 MG/DL
HCT VFR BLD CALC: 40.6 % — SIGNIFICANT CHANGE UP (ref 39–50)
HGB BLD-MCNC: 14.3 G/DL — SIGNIFICANT CHANGE UP (ref 13–17)
IMM GRANULOCYTES NFR BLD AUTO: 0.3 % — SIGNIFICANT CHANGE UP (ref 0–0.9)
KETONES UR-MCNC: ABNORMAL MG/DL
LEUKOCYTE ESTERASE UR-ACNC: ABNORMAL
LYMPHOCYTES # BLD AUTO: 1.34 K/UL — SIGNIFICANT CHANGE UP (ref 1–3.3)
LYMPHOCYTES # BLD AUTO: 17 % — SIGNIFICANT CHANGE UP (ref 13–44)
MCHC RBC-ENTMCNC: 30.8 PG — SIGNIFICANT CHANGE UP (ref 27–34)
MCHC RBC-ENTMCNC: 35.2 GM/DL — SIGNIFICANT CHANGE UP (ref 32–36)
MCV RBC AUTO: 87.3 FL — SIGNIFICANT CHANGE UP (ref 80–100)
MONOCYTES # BLD AUTO: 0.73 K/UL — SIGNIFICANT CHANGE UP (ref 0–0.9)
MONOCYTES NFR BLD AUTO: 9.2 % — SIGNIFICANT CHANGE UP (ref 2–14)
NEUTROPHILS # BLD AUTO: 4.86 K/UL — SIGNIFICANT CHANGE UP (ref 1.8–7.4)
NEUTROPHILS NFR BLD AUTO: 61.5 % — SIGNIFICANT CHANGE UP (ref 43–77)
NITRITE UR-MCNC: NEGATIVE — SIGNIFICANT CHANGE UP
NRBC # BLD: 0 /100 WBCS — SIGNIFICANT CHANGE UP (ref 0–0)
PH UR: 7 — SIGNIFICANT CHANGE UP (ref 5–8)
PLATELET # BLD AUTO: 188 K/UL — SIGNIFICANT CHANGE UP (ref 150–400)
POTASSIUM SERPL-MCNC: 4.8 MMOL/L — SIGNIFICANT CHANGE UP (ref 3.5–5.3)
POTASSIUM SERPL-SCNC: 4.8 MMOL/L — SIGNIFICANT CHANGE UP (ref 3.5–5.3)
PROT UR-MCNC: NEGATIVE MG/DL — SIGNIFICANT CHANGE UP
RBC # BLD: 4.65 M/UL — SIGNIFICANT CHANGE UP (ref 4.2–5.8)
RBC # FLD: 13.1 % — SIGNIFICANT CHANGE UP (ref 10.3–14.5)
RBC CASTS # UR COMP ASSIST: 0 /HPF — SIGNIFICANT CHANGE UP (ref 0–4)
SODIUM SERPL-SCNC: 128 MMOL/L — LOW (ref 135–145)
SP GR SPEC: 1.03 — SIGNIFICANT CHANGE UP (ref 1–1.03)
UROBILINOGEN FLD QL: 1 MG/DL — SIGNIFICANT CHANGE UP (ref 0.2–1)
WBC # BLD: 7.9 K/UL — SIGNIFICANT CHANGE UP (ref 3.8–10.5)
WBC # FLD AUTO: 7.9 K/UL — SIGNIFICANT CHANGE UP (ref 3.8–10.5)
WBC UR QL: 4 /HPF — SIGNIFICANT CHANGE UP (ref 0–5)

## 2024-09-28 PROCEDURE — 96374 THER/PROPH/DIAG INJ IV PUSH: CPT | Mod: XU

## 2024-09-28 PROCEDURE — 36415 COLL VENOUS BLD VENIPUNCTURE: CPT

## 2024-09-28 PROCEDURE — 81001 URINALYSIS AUTO W/SCOPE: CPT

## 2024-09-28 PROCEDURE — 99285 EMERGENCY DEPT VISIT HI MDM: CPT

## 2024-09-28 PROCEDURE — 80048 BASIC METABOLIC PNL TOTAL CA: CPT

## 2024-09-28 PROCEDURE — 99284 EMERGENCY DEPT VISIT MOD MDM: CPT | Mod: 25

## 2024-09-28 PROCEDURE — 82962 GLUCOSE BLOOD TEST: CPT

## 2024-09-28 PROCEDURE — 85025 COMPLETE CBC W/AUTO DIFF WBC: CPT

## 2024-09-28 PROCEDURE — 74177 CT ABD & PELVIS W/CONTRAST: CPT | Mod: MC

## 2024-09-28 PROCEDURE — 74177 CT ABD & PELVIS W/CONTRAST: CPT | Mod: 26,MC

## 2024-09-28 RX ORDER — POLYETHYLENE GLYCOL 3350 17 G/17G
17 POWDER, FOR SOLUTION ORAL
Qty: 1 | Refills: 0
Start: 2024-09-28 | End: 2024-09-29

## 2024-09-28 RX ORDER — METOCLOPRAMIDE HCL 5 MG
10 TABLET ORAL ONCE
Refills: 0 | Status: COMPLETED | OUTPATIENT
Start: 2024-09-28 | End: 2024-09-28

## 2024-09-28 RX ORDER — CEFUROXIME SODIUM 1.5 G
1 VIAL (EA) INJECTION
Qty: 14 | Refills: 0
Start: 2024-09-28 | End: 2024-10-04

## 2024-09-28 RX ORDER — METFORMIN HYDROCHLORIDE 850 MG/1
1 TABLET, FILM COATED ORAL
Refills: 0
Start: 2024-09-28

## 2024-09-28 RX ORDER — PHENAZOPYRIDINE HCL 100 MG
1 TABLET ORAL
Qty: 6 | Refills: 0
Start: 2024-09-28 | End: 2024-09-30

## 2024-09-28 RX ORDER — SITAGLIPTIN 100 MG/1
1 TABLET, FILM COATED ORAL
Refills: 0
Start: 2024-09-28

## 2024-09-28 RX ORDER — ACETAMINOPHEN 325 MG/1
2 TABLET ORAL
Refills: 0
Start: 2024-09-28

## 2024-09-28 RX ADMIN — Medication 10 MILLIGRAM(S): at 02:50

## 2024-09-28 RX ADMIN — Medication 1000 MILLILITER(S): at 02:51

## 2024-09-28 NOTE — ED PROVIDER NOTE - PHYSICAL EXAMINATION
General: Well appearing, no acute distress, appears stated age  HEENT: normocephalic, atraumatic   Respiratory: normal work of breathing  MSK: no swelling or tenderness of lower extremities, moving all extremities spontaneously   Skin: warm, dry  Neuro: A&Ox3, cranial nerves II-XII intact, 5/5 strength in all extremities, no sensory deficits, normal gait   Psych: appropriate affect  ABD: soft, nontender, nondistended, no guarding, no rebound, no CVA tenderness

## 2024-09-28 NOTE — ED PROVIDER NOTE - PATIENT PORTAL LINK FT
You can access the FollowMyHealth Patient Portal offered by [facility] by registering at the following website: [email or web address]. By joining [employer]’s FollowMyHealth portal, you will also be able to view your health information using other applications (apps) compatible with our system.

## 2024-09-28 NOTE — ED ADULT NURSE NOTE - OBJECTIVE STATEMENT
The  patient  is a 64 y Male c/o high  B/P and Constipation x 3 days . Pt vomited x1 non bloody fluid

## 2024-09-28 NOTE — ED PROVIDER NOTE - CLINICAL SUMMARY MEDICAL DECISION MAKING FREE TEXT BOX
patient with constipation x 3 days, CT scan does not show small bowel obstruction, will give MiraLAX and discharge.

## 2024-09-28 NOTE — ED ADULT TRIAGE NOTE - CHIEF COMPLAINT QUOTE
c/o nausea , vomiting after he ate catfish sandwich at 10 pm  pmh - dm , htn , takes glypizide amlodipine ,  metoprolol

## 2024-09-28 NOTE — ED PROVIDER NOTE - OBJECTIVE STATEMENT
64-year-old male   With hypertension, diabetes, high cholesterol presents with constipation x 3 days   X and vomited once.

## 2024-09-28 NOTE — ED ADULT NURSE NOTE - NSFALLUNIVINTERV_ED_ALL_ED
Bed/Stretcher in lowest position, wheels locked, appropriate side rails in place/Call bell, personal items and telephone in reach/Instruct patient to call for assistance before getting out of bed/chair/stretcher/Non-slip footwear applied when patient is off stretcher/[place] to call system/Physically safe environment - no spills, clutter or unnecessary equipment/Purposeful proactive rounding/Room/bathroom lighting operational, light cord in reach

## 2024-10-10 ENCOUNTER — INPATIENT (INPATIENT)
Facility: HOSPITAL | Age: 64
LOS: 3 days | Discharge: HOME | DRG: 125 | End: 2024-10-14
Attending: INTERNAL MEDICINE | Admitting: INTERNAL MEDICINE
Payer: COMMERCIAL

## 2024-10-10 VITALS
SYSTOLIC BLOOD PRESSURE: 143 MMHG | DIASTOLIC BLOOD PRESSURE: 82 MMHG | TEMPERATURE: 98 F | RESPIRATION RATE: 18 BRPM | WEIGHT: 163.14 LBS | HEIGHT: 74 IN | OXYGEN SATURATION: 100 % | HEART RATE: 79 BPM

## 2024-10-10 DIAGNOSIS — H53.9 UNSPECIFIED VISUAL DISTURBANCE: ICD-10-CM

## 2024-10-10 LAB
ALBUMIN SERPL ELPH-MCNC: 4.3 G/DL — SIGNIFICANT CHANGE UP (ref 3.5–5)
ALP SERPL-CCNC: 50 U/L — SIGNIFICANT CHANGE UP (ref 40–120)
ALT FLD-CCNC: 36 U/L DA — SIGNIFICANT CHANGE UP (ref 10–60)
ANION GAP SERPL CALC-SCNC: 8 MMOL/L — SIGNIFICANT CHANGE UP (ref 5–17)
APTT BLD: 32.8 SEC — SIGNIFICANT CHANGE UP (ref 24.5–35.6)
AST SERPL-CCNC: 21 U/L — SIGNIFICANT CHANGE UP (ref 10–40)
BASOPHILS # BLD AUTO: 0.05 K/UL — SIGNIFICANT CHANGE UP (ref 0–0.2)
BASOPHILS NFR BLD AUTO: 0.9 % — SIGNIFICANT CHANGE UP (ref 0–2)
BILIRUB SERPL-MCNC: 0.4 MG/DL — SIGNIFICANT CHANGE UP (ref 0.2–1.2)
BUN SERPL-MCNC: 13 MG/DL — SIGNIFICANT CHANGE UP (ref 7–18)
CALCIUM SERPL-MCNC: 9.3 MG/DL — SIGNIFICANT CHANGE UP (ref 8.4–10.5)
CHLORIDE SERPL-SCNC: 99 MMOL/L — SIGNIFICANT CHANGE UP (ref 96–108)
CO2 SERPL-SCNC: 24 MMOL/L — SIGNIFICANT CHANGE UP (ref 22–31)
CREAT SERPL-MCNC: 1.05 MG/DL — SIGNIFICANT CHANGE UP (ref 0.5–1.3)
EGFR: 78 ML/MIN/1.73M2 — SIGNIFICANT CHANGE UP
EOSINOPHIL # BLD AUTO: 0.8 K/UL — HIGH (ref 0–0.5)
EOSINOPHIL NFR BLD AUTO: 14.9 % — HIGH (ref 0–6)
GLUCOSE SERPL-MCNC: 143 MG/DL — HIGH (ref 70–99)
HCT VFR BLD CALC: 41.6 % — SIGNIFICANT CHANGE UP (ref 39–50)
HGB BLD-MCNC: 14.5 G/DL — SIGNIFICANT CHANGE UP (ref 13–17)
IMM GRANULOCYTES NFR BLD AUTO: 0.4 % — SIGNIFICANT CHANGE UP (ref 0–0.9)
INR BLD: 0.95 RATIO — SIGNIFICANT CHANGE UP (ref 0.85–1.16)
LYMPHOCYTES # BLD AUTO: 1.22 K/UL — SIGNIFICANT CHANGE UP (ref 1–3.3)
LYMPHOCYTES # BLD AUTO: 22.7 % — SIGNIFICANT CHANGE UP (ref 13–44)
MCHC RBC-ENTMCNC: 30.9 PG — SIGNIFICANT CHANGE UP (ref 27–34)
MCHC RBC-ENTMCNC: 34.9 GM/DL — SIGNIFICANT CHANGE UP (ref 32–36)
MCV RBC AUTO: 88.7 FL — SIGNIFICANT CHANGE UP (ref 80–100)
MONOCYTES # BLD AUTO: 0.45 K/UL — SIGNIFICANT CHANGE UP (ref 0–0.9)
MONOCYTES NFR BLD AUTO: 8.4 % — SIGNIFICANT CHANGE UP (ref 2–14)
NEUTROPHILS # BLD AUTO: 2.83 K/UL — SIGNIFICANT CHANGE UP (ref 1.8–7.4)
NEUTROPHILS NFR BLD AUTO: 52.7 % — SIGNIFICANT CHANGE UP (ref 43–77)
NRBC # BLD: 0 /100 WBCS — SIGNIFICANT CHANGE UP (ref 0–0)
PLATELET # BLD AUTO: 213 K/UL — SIGNIFICANT CHANGE UP (ref 150–400)
POTASSIUM SERPL-MCNC: 4.7 MMOL/L — SIGNIFICANT CHANGE UP (ref 3.5–5.3)
POTASSIUM SERPL-SCNC: 4.7 MMOL/L — SIGNIFICANT CHANGE UP (ref 3.5–5.3)
PROT SERPL-MCNC: 7.8 G/DL — SIGNIFICANT CHANGE UP (ref 6–8.3)
PROTHROM AB SERPL-ACNC: 11.1 SEC — SIGNIFICANT CHANGE UP (ref 9.9–13.4)
RBC # BLD: 4.69 M/UL — SIGNIFICANT CHANGE UP (ref 4.2–5.8)
RBC # FLD: 12.9 % — SIGNIFICANT CHANGE UP (ref 10.3–14.5)
SODIUM SERPL-SCNC: 131 MMOL/L — LOW (ref 135–145)
WBC # BLD: 5.37 K/UL — SIGNIFICANT CHANGE UP (ref 3.8–10.5)
WBC # FLD AUTO: 5.37 K/UL — SIGNIFICANT CHANGE UP (ref 3.8–10.5)

## 2024-10-10 PROCEDURE — 99285 EMERGENCY DEPT VISIT HI MDM: CPT

## 2024-10-10 PROCEDURE — 93010 ELECTROCARDIOGRAM REPORT: CPT

## 2024-10-10 PROCEDURE — 99223 1ST HOSP IP/OBS HIGH 75: CPT

## 2024-10-10 PROCEDURE — 70496 CT ANGIOGRAPHY HEAD: CPT | Mod: 26,MC

## 2024-10-10 PROCEDURE — 70498 CT ANGIOGRAPHY NECK: CPT | Mod: 26,MC

## 2024-10-10 NOTE — ED PROVIDER NOTE - OBJECTIVE STATEMENT
64-year-old male with past medical history of HTN, hyperlipidemia, DM who presents from eye doctor for further evaluation of blurred vision over the last 2 weeks.  Sent in to rule out CVA.  Patient denies any other weakness.

## 2024-10-10 NOTE — H&P ADULT - PROBLEM SELECTOR PLAN 1
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  no focal numbness or weakness  CT head: no ICH, mass or infarct  WBC 5.37  taking ethambutol for MAC infection  DD: CVA less likely Vs optic neuritis 2/2 ethambutol vs meningitis (less likely in absence of signs of infection)    -admit to tele  -c/w aspirin, plavix and statin (home meds)  -holding Abx until ?LP   -f/u A1c, lipid,TSH  -f/u TTE with bubble study  -f/u MRI brain with contrast to evaluate optic nerve  -?Lumbar puncture  -Neuro consult in am 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

## 2024-10-10 NOTE — ED ADULT NURSE NOTE - OBJECTIVE STATEMENT
Patient presented to ED C/O Patient presented to ED C/O blurred vision for last 2 weeks. Patient was seen by ophthalmology this afternoon. patient reported that his right eye is damaged and left eye has cataract. Patient was sent by optho to r/o stroke as well. Patient reports weakness for past couple of days with ambulation Patient presented to ED C/O blurred vision for last 2 weeks. Patient was seen by ophthalmology this afternoon. patient reported that his right eye is damaged and left eye has cataract. Patient was sent by optho to r/o stroke as well. Patient reports weakness for past couple of days with ambulation. No slurred speech. no facial droop. Strong hand . Hx of DM and HTN.

## 2024-10-10 NOTE — H&P ADULT - ATTENDING COMMENTS
Vital Signs Last 24 Hrs  T(C): 37.2 (10 Oct 2024 21:45), Max: 37.2 (10 Oct 2024 21:45)  T(F): 98.9 (10 Oct 2024 21:45), Max: 98.9 (10 Oct 2024 21:45)  HR: 77 (10 Oct 2024 21:45) (77 - 79)  BP: 142/88 (10 Oct 2024 21:45) (142/88 - 143/82)  BP(mean): --  RR: 18 (10 Oct 2024 21:45) (18 - 18)  SpO2: 99% (10 Oct 2024 21:45) (99% - 100%)    Parameters below as of 10 Oct 2024 21:45  Patient On (Oxygen Delivery Method): room air Vital Signs Last 24 Hrs  T(C): 37.2 (10 Oct 2024 21:45), Max: 37.2 (10 Oct 2024 21:45)  T(F): 98.9 (10 Oct 2024 21:45), Max: 98.9 (10 Oct 2024 21:45)  HR: 77 (10 Oct 2024 21:45) (77 - 79)  BP: 142/88 (10 Oct 2024 21:45) (142/88 - 143/82)  RR: 18 (10 Oct 2024 21:45) (18 - 18)  SpO2: 99% (10 Oct 2024 21:45) (99% - 100%)  Parameters below as of 10 Oct 2024 21:45  Patient On (Oxygen Delivery Method): room air    Labs reviewed  normal wbc with relative eosinophilia      UA   glycosuria      Impression   64 year old man with medical hx including HTN, DM, HLD, hepatic steatosis noted on recent abdominal imaging Vital Signs Last 24 Hrs  T(C): 37.2 (10 Oct 2024 21:45), Max: 37.2 (10 Oct 2024 21:45)  T(F): 98.9 (10 Oct 2024 21:45), Max: 98.9 (10 Oct 2024 21:45)  HR: 77 (10 Oct 2024 21:45) (77 - 79)  BP: 142/88 (10 Oct 2024 21:45) (142/88 - 143/82)  RR: 18 (10 Oct 2024 21:45) (18 - 18)  SpO2: 99% (10 Oct 2024 21:45) (99% - 100%)  Parameters below as of 10 Oct 2024 21:45  Patient On (Oxygen Delivery Method): room air    Labs reviewed  normal wbc with relative eosinophilia  Na 131    UA   - glycosuria      Impression   64 year old man with medical hx including HTN, DM, HLD, hepatic steatosis noted on recent abdominal imaging with about 2 weeks of persistent blurry vision with other symptoms on ROS. He was seen in the Eye clinic and sent to the ED after  evaluation did not reveal any opthalmologic pathology.  He is admitted for CVA work up.  Work up so far with head imaging including CTA of the head and neck are unremarkable which considering the length of his symptoms should reveal any ischemic changes on head imaging if present  Will admit to telemetry   Continue serial NIHSS  No antiplatelet therapy   ECHO   Neurology consult   Medication reconciliation  Check fasting lipid and A1c levels Vital Signs Last 24 Hrs  T(C): 37.2 (10 Oct 2024 21:45), Max: 37.2 (10 Oct 2024 21:45)  T(F): 98.9 (10 Oct 2024 21:45), Max: 98.9 (10 Oct 2024 21:45)  HR: 77 (10 Oct 2024 21:45) (77 - 79)  BP: 142/88 (10 Oct 2024 21:45) (142/88 - 143/82)  RR: 18 (10 Oct 2024 21:45) (18 - 18)  SpO2: 99% (10 Oct 2024 21:45) (99% - 100%)  Parameters below as of 10 Oct 2024 21:45  Patient On (Oxygen Delivery Method): room air    Labs reviewed  normal wbc with relative eosinophilia  Na 131    UA   - glycosuria      Impression   64 year old man with medical hx including CAD s/p PCI - most recently 2 months ago on DAPT, HTN, DM, HLD, Pulmonary EBENEZER on (Rifampin, Ethambutol, Azithromycin) hepatic steatosis noted on recent abdominal imaging with about 2 weeks of persistent blurry vision with no associated symptoms on ROS.   He was seen in the Eye clinic and sent to the [facility] ED after evaluation showed decreased vision of the right  but patient ended up in our facility.  He was admitted for CVA work up  .  Work up so far with head imaging including CTA of the head and neck are unremarkable which considering the length of his symptoms should reveal any ischemic changes on head imaging if present.    Concerns for Optic Neuritis considering exposure to ethambutol   Pt has not experienced any pain but nonetheless with obtain an MRI of the orbits and brain to rule out.      Will admit to telemetry   Continue serial NIHSS  Already on DAPT  ECHO   Neurology consult   MRI brain and orbits  Medication reconciliation  Check fasting lipid and A1c levels  Consider transfer for opthalmology evaluation in [facility] based on imaging findings.

## 2024-10-10 NOTE — ED PROVIDER NOTE - CLINICAL SUMMARY MEDICAL DECISION MAKING FREE TEXT BOX
64-year-old male who has had blurred vision over the last 2 weeks and was sent in by eye doctor for rule out CVA.  NIH stroke scale 0.  Remainder of exam as above.  Patient describes diffuse blurred vision in either eye.  No diplopia.  Laboratories unremarkable.  Patient care signed out pending results of CTA and likely admission for TIA/Stroke workup. 64-year-old male who has had blurred vision over the last 2 weeks and was sent in by eye doctor for rule out CVA.  NIH stroke scale 0.  Remainder of exam as above.  Patient describes diffuse blurred vision in either eye.  No diplopia.  Laboratories unremarkable.  CTA negative.   Patient admitted for further evaluation including MRI  and neurology.

## 2024-10-10 NOTE — H&P ADULT - NSHPPHYSICALEXAM_GEN_ALL_CORE
T(C): 36.9 (10-11-24 @ 04:41), Max: 37.2 (10-10-24 @ 21:45)  HR: 82 (10-11-24 @ 04:41) (77 - 89)  BP: 113/75 (10-11-24 @ 04:41) (113/75 - 143/82)  RR: 18 (10-11-24 @ 04:41) (17 - 18)  SpO2: 98% (10-11-24 @ 04:41) (97% - 100%)    GENERAL: NAD, speaks in full sentences, no signs of respiratory distress  HEAD:  Atraumatic, Normocephalic  EYES: EOMI, PERRLA, conjunctiva and sclera clear  NECK: Supple, No JVD  CHEST/LUNG: Clear to auscultation bilaterally; No wheeze; No crackles; No accessory muscles used  HEART: Regular rate and rhythm; No murmurs;   ABDOMEN: Soft, Nontender, Nondistended; Bowel sounds present; No guarding  EXTREMITIES:  2+ Peripheral Pulses, No cyanosis or edema  NEUROLOGY: No motor or sensory deficit, no facial asymmetry, EOM intact, no visual field loss  SKIN: No rashes or lesions

## 2024-10-10 NOTE — ED ADULT NURSE NOTE - NSFALLRISKINTERV_ED_ALL_ED

## 2024-10-10 NOTE — H&P ADULT - HISTORY OF PRESENT ILLNESS
64y/M, from home, with PMH of HTN, DM, CAD s/p stent X2 (last on Aug 24), HLD, MAC infection (taking Rifampicin, Ethambutol and Azithro). presents to ED with blurring of vision, started 2 days ago, acute in onset, worse over last 2-3 days.  64y/M, from home, with PMH of HTN, DM, CAD s/p stent X2 (last on Aug 24), HLD, MAC infection (taking Rifampicin, Ethambutol and Azithro). presents to ED with blurring of vision, started 2 weeks ago, acute in onset, worse over last 2-3 days. States he cannot see well in dim lights, denies pain, photophobia or focal vision loss. States he had an episode of fever 2 days ago and had one episode of vomiting, NBNB. Denies neck rigidity. Denies focal weakness and numbness. As per the patient he went to his eye doctor today, there he was told he has issues with his right eye after examination and was told to go to  [facility] for ophthalmic evaluation, but he came to [facility] as this was closer to his home. States he is open to transfer to [facility] if needed.  Patient mentions he started taking 3 antibiotics for 'bacterial lung infection' 9m ago and follows up with Dr Aydin Patel outpt.

## 2024-10-10 NOTE — H&P ADULT - PROBLEM SELECTOR PLAN 6
ongoing treatment for MAC with Rifampin, Ethambutol and Azithromycin  f/u with Dr Aydin Patel  ?optic neuritis 2/2 ethambutol  -holding ethanbutol for now  -c/w rifampin and azithro

## 2024-10-10 NOTE — H&P ADULT - ASSESSMENT
64y/M, from home, with PMH of HTN, DM, CAD s/p stent X2 (last on Aug 24), HLD, MAC infection (taking Rifampicin, Ethambutol and Azithro). presents to ED with blurring of vision, started 2 days ago, acute in onset, worse over last 2-3 days. Admitted to r/o CVA vs optic neuritis.

## 2024-10-10 NOTE — ED ADULT NURSE NOTE - SUICIDE SCREENING QUESTION 2
Hospital Discharge Follow-Up Date/Time:  2019 11:00 AM 
 
Patient was admitted to [facility] on  and discharged on  for GI bleed. The physician discharge summary was available at the time of outreach. Patient was contacted within 1 business days of discharge. GI Bleed Bundle Top Challenges reviewed with the provider Patient has 5 day cruise scheduled for  [facility] on discharge- 8.5 On discharge papers zetia, lipitor, ranexa, and norvasc were held for 2 weeks while patient is taking antibiotics. Patient to resume Xarelto and ASA 3 days (8/15) after EGD. Method of communication with provider : yellow box. Inpatient RRAT score:28 Was this a readmission? no  
 
Nurse Navigator (NN) contacted the patient by telephone to perform post hospital discharge assessment. Verified name and  with patient as identifiers. Provided introduction to self, and explanation of the Nurse Navigator role. Reviewed discharge instructions and red flags with patient who verbalized understanding. Patient given an opportunity to ask questions and does not have any further questions or concerns at this time. The patient agrees to contact the PCP office for questions related to their healthcare. NN provided contact information for future reference. Summary of patient's top problems: 
1. GI bleed- Hgb-8.5, stool heme positive on xarelto and asa 81mg. EGD shows gastric ulcer with no acute bleed. GI note-positive CHAUNCEY test result to eradicate Helicobacter pylori; hold Statin-agent during treatment 2. Hx CVA, TIA in 2019 3. Patient planning to go on a 5 day cruise in 10 days (). Home Health orders at discharge: none Barriers to care? lack of knowledge about disease Advance Care Planning:  
Does patient have an Advance Directive:  not on file Medication(s):  
New Medications at Discharge: amoxicillin (AMOXIL) 500 mg capsule Take 2 Caps by mouth every twelve (12) hours. Qty: 28 Cap, Refills: 0  
   
clarithromycin (BIAXIN) 500 mg tablet Take 1 Tab by mouth two (2) times a day. Qty: 28 Tab, Refills: 0  
   
pantoprazole (PROTONIX) 40 mg tablet Take 1 Tab by mouth Before breakfast and dinner. Take 1 tablet twice a day x 2 weeks and then daily there after 
Qty: 28 Tab, Refills: 4 Changed Medications at Discharge:  
ezetimibe (ZETIA) 10 mg tablet Take 1 Tab by mouth daily. Hold for 2 weeks while taking antibiotics Qty: 30 Tab, Refills: 0  
atorvastatin (LIPITOR) 40 mg tablet Take 1 Tab by mouth nightly. Hold for 2 weeks while taking antibioticx Qty: 30 Tab, Refills: 0  
ranolazine ER (RANEXA) 500 mg SR tablet Take 1 Tab by mouth two (2) times a day. Hold ranexa for two weeks while taking antibiotics Qty: 30 Tab, Refills: 0  
amLODIPine (NORVASC) 10 mg tablet Take 1 Tab by mouth daily. Hold norvasc for 2 weeks while taking antibiotics Qty: 30 Tab, Refills: 0 Discontinued Medications at Discharge: These medications are to be resumed 72 hours (8/15) post EGD  
 rivaroxaban (XARELTO) 20 mg tab tablet   
  aspirin delayed-release 81 mg tablet Medication reconciliation was performed with patient, who verbalizes understanding of administration of home medications. There were no barriers to obtaining medications identified at this time. Referral to Pharm D needed: no  
 
Current Outpatient Medications Medication Sig  
 amoxicillin (AMOXIL) 500 mg capsule Take 2 Caps by mouth every twelve (12) hours.  clarithromycin (BIAXIN) 500 mg tablet Take 1 Tab by mouth two (2) times a day.  ezetimibe (ZETIA) 10 mg tablet Take 1 Tab by mouth daily. Hold for 2 weeks while taking antibiotics  atorvastatin (LIPITOR) 40 mg tablet Take 1 Tab by mouth nightly. Hold for 2 weeks while taking antibioticx  ranolazine ER (RANEXA) 500 mg SR tablet Take 1 Tab by mouth two (2) times a day. Hold ranexa for two weeks while taking antibiotics  amLODIPine (NORVASC) 10 mg tablet Take 1 Tab by mouth daily. Hold norvasc for 2 weeks while taking antibiotics  pantoprazole (PROTONIX) 40 mg tablet Take 1 Tab by mouth Before breakfast and dinner. Take 1 tablet twice a day x 2 weeks and then daily there after  olmesartan-hydroCHLOROthiazide (BENICAR HCT) 40-12.5 mg per tablet TK 1 T PO ONCE D FOR HYPERTENSION  fluticasone propionate (FLONASE) 50 mcg/actuation nasal spray 1 [place] by Both Nostrils route two (2) times daily as needed for Rhinitis.  carvedilol (COREG CR) 80 mg CR capsule Take 80 mg by mouth daily (with breakfast).  potassium chloride (K-DUR, KLOR-CON) 20 mEq tablet Take 20 mEq by mouth daily. No current facility-administered medications for this visit. Medications Discontinued During This Encounter Medication Reason  ondansetron (ZOFRAN ODT) 4 mg disintegrating tablet Therapy Completed [facility] follow up appointment(s):  
Future Appointments Date Time Provider Jael Martinez 8/15/2019  1:30 PM MD Karen Mari [phone]  
9/26/2019  8:15 AM MD Karen Mariterangela [phone] [facility] follow up appointment(s):  
GI- Dr. [email or web address] (arrive at 1230pm) Location- 
[address], [address] [place], [address] [facility] Call for pre-registration [phone] Goals  Knowledge and adherence of prescribed medication 8/13 Patient will hold medications (zetia, lipitor, ranexa, and norvasc) as directed while taking antibiotics. He will resume these medication when antibiotics are completed in 14 days or as directed by physician (8/26). BEB  Understands red flags post discharge. 8/13 Patient will monitor for s/s bleeding and report any symptoms to provider. GI bleed educational material provided.   BEB 
  
  
 
 
 No

## 2024-10-10 NOTE — ED ADULT NURSE NOTE - ED CARDIAC HEART SOUNDS
9/7/2022     PATIENT: Madyson Baron  66 year old  female    MRN: [id number]     YOB: 1956     DATE OF SURGERY: 9/7/2022       SURGEON: Walker Sutton M.D.    ASSISTANT: None    PREOPERATIVE DIAGNOSIS:  H25.811 (combined forms of age-related cataract, right eye)  Right Cataract H25.811 with CPT 42782    POSTOPERATIVE DIAGNOSIS:  H25.811 (combined forms of age-related cataract, right eye)  SAME    PROCEDURE PERFORMED:  Procedures:    * Phacoemulsification Right Eye with Dextenza    IMPLANT LENS MODEL:  [id number]    DIOPTER POWER:  +23.5 D    ANESTHESIA STAFF: CRNA: Nilda Baldwin CRNA     ANESTHESIA:  MAC     ESTIMATED BLOOD LOSS: None    COMPLICATIONS: None    Specimens Removed:  No specimens collected during this procedure.     ESTIMATED BLOOD LOSS: NONE     INDICATIONS:   This patient was evaluated in the office with complaints of decreased visual function in the right eye. Physical examination revealed a H25.811 (combined forms of age-related cataract, right eye) cataract in the right eye. Following review of the informed consent, the patient elected to proceed with cataract surgery of the right eye.     DESCRIPTION OF PROCEDURE:  Following verification and review of the informed consent and marking the correct operative eye, the patient was taken to the operating room and was hooked up to the monitors by Anesthesia.  Proparacaine drops were placed in the right eye for topical anesthesia, and the patient was prepped and draped in the usual sterile fashion for cataract surgery.     Attention was directed to the right eye where an eyelid speculum was placed. A paracentesis port was then made at the superior limbus. The anterior chamber was then partially filled with 1% preservative free Lidocaine followed by Viscoelastic. Next, a clear corneal incision was made using a 2.5 millimeter micro-keratome. The cystitome was then introduced and the capsulorhexis was started. The Ptariziaata forceps were then used to  complete the capsulorhexis in a curvilinear fashion. The nucleus was hydro dissected using Balanced Salt Solution on a 30 gauge cannula. The nucleus was then removed using a standard divide and conquer technique. The remaining cortical material was then removed using the irrigation and aspiration handpiece. The capsular bag was inflated with Viscoelastic and the above mentioned intraocular lens was inserted without incident. The remaining Viscoelastic was then removed using irrigation and aspiration. 0.05 cc of moxifloxacin was injected into the anterior chamber. The wound was then hydrated using Balanced Salt Solution on a 30 gauge cannula.. The Weck-Angie sponges were used to ensure that there was a watertight seal. The eyelid speculum was then removed.The decision to place the Dextenza implant was made preoperatively with the patient, as indicated to control ocular inflammation and pain following cataract surgery. The right lower eyelid puncta was dilated.  The Dextenza implant was then inserted without incident.      The patient tolerated the procedure well Without intraoperative complications and will follow up as directed tomorrow AM.     The patient was instructed to minimize exertion, not to rub the eye, to wear the eye shield when asleep, and to take Tylenol for pain.           I was present for the key portions of the procedure and was immediately available for the non-key portions     Dictating Provider,   Walker Sutton MD  8:25 AM 9/7/2022     normal S1, S2 heard

## 2024-10-10 NOTE — ED PROVIDER NOTE - PHYSICAL EXAMINATION
General: Patient well appearing, vital signs within normal limits  HEENT: MMM, trachea midline  Eyes: EOMI, no visual field deficits, vision grossly intact  Respiratory: No respiratory distress  Neuro:  NIH stroke scale 0, no motor or sensory deficits, normal finger-to-nose, good  strength  Skin: No rashes or lesions

## 2024-10-11 DIAGNOSIS — I25.10 ATHEROSCLEROTIC HEART DISEASE OF NATIVE CORONARY ARTERY WITHOUT ANGINA PECTORIS: ICD-10-CM

## 2024-10-11 DIAGNOSIS — Z95.1 PRESENCE OF AORTOCORONARY BYPASS GRAFT: Chronic | ICD-10-CM

## 2024-10-11 DIAGNOSIS — E78.5 HYPERLIPIDEMIA, UNSPECIFIED: ICD-10-CM

## 2024-10-11 DIAGNOSIS — E11.9 TYPE 2 DIABETES MELLITUS WITHOUT COMPLICATIONS: ICD-10-CM

## 2024-10-11 DIAGNOSIS — A31.0 PULMONARY MYCOBACTERIAL INFECTION: ICD-10-CM

## 2024-10-11 DIAGNOSIS — I10 ESSENTIAL (PRIMARY) HYPERTENSION: ICD-10-CM

## 2024-10-11 DIAGNOSIS — Z29.9 ENCOUNTER FOR PROPHYLACTIC MEASURES, UNSPECIFIED: ICD-10-CM

## 2024-10-11 DIAGNOSIS — H53.8 OTHER VISUAL DISTURBANCES: ICD-10-CM

## 2024-10-11 LAB
A1C WITH ESTIMATED AVERAGE GLUCOSE RESULT: 7.4 % — HIGH (ref 4–5.6)
ANION GAP SERPL CALC-SCNC: 13 MMOL/L — SIGNIFICANT CHANGE UP (ref 5–17)
BASOPHILS # BLD AUTO: 0.06 K/UL — SIGNIFICANT CHANGE UP (ref 0–0.2)
BASOPHILS NFR BLD AUTO: 0.8 % — SIGNIFICANT CHANGE UP (ref 0–2)
BUN SERPL-MCNC: 14 MG/DL — SIGNIFICANT CHANGE UP (ref 7–18)
CALCIUM SERPL-MCNC: 9 MG/DL — SIGNIFICANT CHANGE UP (ref 8.4–10.5)
CHLORIDE SERPL-SCNC: 98 MMOL/L — SIGNIFICANT CHANGE UP (ref 96–108)
CHOLEST SERPL-MCNC: 78 MG/DL — SIGNIFICANT CHANGE UP
CO2 SERPL-SCNC: 21 MMOL/L — LOW (ref 22–31)
CREAT SERPL-MCNC: 1.03 MG/DL — SIGNIFICANT CHANGE UP (ref 0.5–1.3)
EGFR: 80 ML/MIN/1.73M2 — SIGNIFICANT CHANGE UP
EOSINOPHIL # BLD AUTO: 1.03 K/UL — HIGH (ref 0–0.5)
EOSINOPHIL NFR BLD AUTO: 14 % — HIGH (ref 0–6)
ESTIMATED AVERAGE GLUCOSE: 166 MG/DL — HIGH (ref 68–114)
GLUCOSE BLDC GLUCOMTR-MCNC: 181 MG/DL — HIGH (ref 70–99)
GLUCOSE BLDC GLUCOMTR-MCNC: 217 MG/DL — HIGH (ref 70–99)
GLUCOSE BLDC GLUCOMTR-MCNC: 248 MG/DL — HIGH (ref 70–99)
GLUCOSE BLDC GLUCOMTR-MCNC: 250 MG/DL — HIGH (ref 70–99)
GLUCOSE SERPL-MCNC: 143 MG/DL — HIGH (ref 70–99)
HCT VFR BLD CALC: 40.3 % — SIGNIFICANT CHANGE UP (ref 39–50)
HDLC SERPL-MCNC: 29 MG/DL — LOW
HGB BLD-MCNC: 14.1 G/DL — SIGNIFICANT CHANGE UP (ref 13–17)
IMM GRANULOCYTES NFR BLD AUTO: 0.1 % — SIGNIFICANT CHANGE UP (ref 0–0.9)
LIPID PNL WITH DIRECT LDL SERPL: SIGNIFICANT CHANGE UP MG/DL
LYMPHOCYTES # BLD AUTO: 2.19 K/UL — SIGNIFICANT CHANGE UP (ref 1–3.3)
LYMPHOCYTES # BLD AUTO: 29.8 % — SIGNIFICANT CHANGE UP (ref 13–44)
MAGNESIUM SERPL-MCNC: 1.8 MG/DL — SIGNIFICANT CHANGE UP (ref 1.6–2.6)
MCHC RBC-ENTMCNC: 31.1 PG — SIGNIFICANT CHANGE UP (ref 27–34)
MCHC RBC-ENTMCNC: 35 GM/DL — SIGNIFICANT CHANGE UP (ref 32–36)
MCV RBC AUTO: 88.8 FL — SIGNIFICANT CHANGE UP (ref 80–100)
MONOCYTES # BLD AUTO: 0.65 K/UL — SIGNIFICANT CHANGE UP (ref 0–0.9)
MONOCYTES NFR BLD AUTO: 8.8 % — SIGNIFICANT CHANGE UP (ref 2–14)
NEUTROPHILS # BLD AUTO: 3.41 K/UL — SIGNIFICANT CHANGE UP (ref 1.8–7.4)
NEUTROPHILS NFR BLD AUTO: 46.5 % — SIGNIFICANT CHANGE UP (ref 43–77)
NON HDL CHOLESTEROL: 49 MG/DL — SIGNIFICANT CHANGE UP
NRBC # BLD: 0 /100 WBCS — SIGNIFICANT CHANGE UP (ref 0–0)
PHOSPHATE SERPL-MCNC: 4.2 MG/DL — SIGNIFICANT CHANGE UP (ref 2.5–4.5)
PLATELET # BLD AUTO: 220 K/UL — SIGNIFICANT CHANGE UP (ref 150–400)
POTASSIUM SERPL-MCNC: 4.2 MMOL/L — SIGNIFICANT CHANGE UP (ref 3.5–5.3)
POTASSIUM SERPL-SCNC: 4.2 MMOL/L — SIGNIFICANT CHANGE UP (ref 3.5–5.3)
RBC # BLD: 4.54 M/UL — SIGNIFICANT CHANGE UP (ref 4.2–5.8)
RBC # FLD: 13.1 % — SIGNIFICANT CHANGE UP (ref 10.3–14.5)
SODIUM SERPL-SCNC: 132 MMOL/L — LOW (ref 135–145)
TRIGL SERPL-MCNC: 315 MG/DL — HIGH
WBC # BLD: 7.35 K/UL — SIGNIFICANT CHANGE UP (ref 3.8–10.5)
WBC # FLD AUTO: 7.35 K/UL — SIGNIFICANT CHANGE UP (ref 3.8–10.5)

## 2024-10-11 PROCEDURE — 99233 SBSQ HOSP IP/OBS HIGH 50: CPT | Mod: GC

## 2024-10-11 PROCEDURE — 70542 MRI ORBIT/FACE/NECK W/DYE: CPT | Mod: 26

## 2024-10-11 PROCEDURE — 70552 MRI BRAIN STEM W/DYE: CPT | Mod: 26

## 2024-10-11 PROCEDURE — 71045 X-RAY EXAM CHEST 1 VIEW: CPT | Mod: 26

## 2024-10-11 RX ORDER — ASPIRIN 325 MG
81 TABLET ORAL DAILY
Refills: 0 | Status: DISCONTINUED | OUTPATIENT
Start: 2024-10-11 | End: 2024-10-14

## 2024-10-11 RX ORDER — INSULIN LISPRO 100/ML
VIAL (ML) SUBCUTANEOUS
Refills: 0 | Status: DISCONTINUED | OUTPATIENT
Start: 2024-10-11 | End: 2024-10-14

## 2024-10-11 RX ORDER — METOPROLOL TARTRATE 50 MG
50 TABLET ORAL
Refills: 0 | Status: DISCONTINUED | OUTPATIENT
Start: 2024-10-11 | End: 2024-10-14

## 2024-10-11 RX ORDER — ENOXAPARIN SODIUM 150 MG/ML
40 INJECTION SUBCUTANEOUS EVERY 24 HOURS
Refills: 0 | Status: DISCONTINUED | OUTPATIENT
Start: 2024-10-11 | End: 2024-10-14

## 2024-10-11 RX ORDER — AMLODIPINE BESYLATE 5 MG
5 TABLET ORAL DAILY
Refills: 0 | Status: DISCONTINUED | OUTPATIENT
Start: 2024-10-11 | End: 2024-10-14

## 2024-10-11 RX ORDER — AMLODIPINE BESYLATE AND OLMESARTAN MEDOXOMIL 10; 20 MG/1; MG/1
1 TABLET, FILM COATED ORAL
Refills: 0 | DISCHARGE

## 2024-10-11 RX ORDER — LOSARTAN POTASSIUM 100 MG/1
100 TABLET, FILM COATED ORAL DAILY
Refills: 0 | Status: DISCONTINUED | OUTPATIENT
Start: 2024-10-11 | End: 2024-10-14

## 2024-10-11 RX ORDER — ATORVASTATIN CALCIUM 10 MG/1
80 TABLET, FILM COATED ORAL AT BEDTIME
Refills: 0 | Status: DISCONTINUED | OUTPATIENT
Start: 2024-10-11 | End: 2024-10-14

## 2024-10-11 RX ORDER — INSULIN LISPRO 100/ML
VIAL (ML) SUBCUTANEOUS AT BEDTIME
Refills: 0 | Status: DISCONTINUED | OUTPATIENT
Start: 2024-10-11 | End: 2024-10-14

## 2024-10-11 RX ORDER — AZITHROMYCIN 250 MG/1
500 TABLET, FILM COATED ORAL DAILY
Refills: 0 | Status: DISCONTINUED | OUTPATIENT
Start: 2024-10-11 | End: 2024-10-14

## 2024-10-11 RX ORDER — EZETIMIBE 10 MG/1
10 TABLET ORAL DAILY
Refills: 0 | Status: DISCONTINUED | OUTPATIENT
Start: 2024-10-11 | End: 2024-10-14

## 2024-10-11 RX ORDER — ASPIRIN 325 MG
1 TABLET ORAL
Refills: 0 | DISCHARGE

## 2024-10-11 RX ORDER — AZELASTINE HYDROCHLORIDE OPHTHALMIC SOLUTION 0.05% 0.5 MG/ML
1 SOLUTION/ DROPS OPHTHALMIC
Refills: 0 | DISCHARGE

## 2024-10-11 RX ADMIN — EZETIMIBE 10 MILLIGRAM(S): 10 TABLET ORAL at 18:03

## 2024-10-11 RX ADMIN — Medication 81 MILLIGRAM(S): at 16:05

## 2024-10-11 RX ADMIN — ATORVASTATIN CALCIUM 80 MILLIGRAM(S): 10 TABLET, FILM COATED ORAL at 21:56

## 2024-10-11 RX ADMIN — AZITHROMYCIN 500 MILLIGRAM(S): 250 TABLET, FILM COATED ORAL at 16:05

## 2024-10-11 RX ADMIN — Medication 2: at 17:39

## 2024-10-11 RX ADMIN — Medication 5 MILLIGRAM(S): at 06:37

## 2024-10-11 RX ADMIN — Medication 50 MILLIGRAM(S): at 06:37

## 2024-10-11 RX ADMIN — ENOXAPARIN SODIUM 40 MILLIGRAM(S): 150 INJECTION SUBCUTANEOUS at 18:03

## 2024-10-11 RX ADMIN — Medication 1: at 07:59

## 2024-10-11 RX ADMIN — Medication 75 MILLIGRAM(S): at 16:05

## 2024-10-11 RX ADMIN — LOSARTAN POTASSIUM 100 MILLIGRAM(S): 100 TABLET, FILM COATED ORAL at 06:37

## 2024-10-11 RX ADMIN — Medication 50 MILLIGRAM(S): at 18:03

## 2024-10-11 NOTE — PROGRESS NOTE ADULT - PROBLEM SELECTOR PLAN 3
c/w home meds c/w home meds  - Atorvastatin 80mg qd, ezetimibe 10mg qd hx of htn on amlodipine-olmesartan, metoprolol  - c/w home meds and losartan [interchange for olmesartan]

## 2024-10-11 NOTE — PROGRESS NOTE ADULT - SUBJECTIVE AND OBJECTIVE BOX
PGY-1 Progress Note discussed with attending    PAGER #: [[phone]] TILL 5:00 PM  PLEASE CONTACT ON CALL TEAM:  - On Call Team (Please refer to Ranjeet) FROM 5:00 PM - 8:30PM  - Nightfloat Team FROM 8:30 -7:30 AM    INTERVAL HPI/OVERNIGHT EVENTS:     No overnight events. Patient was seen and evaluated at bedside. Reports no symptoms other than blurry vision particularly in the right eye. Denies HA, dizziness, N/V/D, chest pain, SOB, or weakness.      REVIEW OF SYSTEMS:  CONSTITUTIONAL: No fever, weight loss, or fatigue  RESPIRATORY: No cough, wheezing, chills or hemoptysis; No shortness of breath  CARDIOVASCULAR: No chest pain, palpitations, dizziness, or leg swelling  GASTROINTESTINAL: No abdominal pain. No nausea, vomiting, or hematemesis; No diarrhea or constipation. No melena or hematochezia.  GENITOURINARY: No dysuria or hematuria, urinary frequency  NEUROLOGICAL: No headaches, memory loss, loss of strength, numbness, or tremors  SKIN: No itching, burning, rashes, or lesions     MEDICATIONS  (STANDING):  amLODIPine   Tablet 5 milliGRAM(s) Oral daily  aspirin enteric coated 81 milliGRAM(s) Oral daily  atorvastatin 80 milliGRAM(s) Oral at bedtime  azithromycin   Tablet 500 milliGRAM(s) Oral daily  clopidogrel Tablet 75 milliGRAM(s) Oral daily  enoxaparin Injectable 40 milliGRAM(s) SubCutaneous every 24 hours  ezetimibe 10 milliGRAM(s) Oral daily  insulin lispro (ADMELOG) corrective regimen sliding scale   SubCutaneous three times a day before meals  insulin lispro (ADMELOG) corrective regimen sliding scale   SubCutaneous at bedtime  losartan 100 milliGRAM(s) Oral daily  metoprolol tartrate 50 milliGRAM(s) Oral two times a day  rifAMPin 600 milliGRAM(s) Oral daily    MEDICATIONS  (PRN):      Vital Signs Last 24 Hrs  T(C): 37.1 (11 Oct 2024 11:06), Max: 37.2 (10 Oct 2024 21:45)  T(F): 98.7 (11 Oct 2024 11:06), Max: 98.9 (10 Oct 2024 21:45)  HR: 83 (11 Oct 2024 11:06) (77 - 89)  BP: 127/79 (11 Oct 2024 11:06) (113/75 - 143/82)  BP(mean): --  RR: 18 (11 Oct 2024 11:06) (17 - 18)  SpO2: 99% (11 Oct 2024 11:06) (97% - 100%)    Parameters below as of 11 Oct 2024 11:06  Patient On (Oxygen Delivery Method): room air        PHYSICAL EXAMINATION:  GENERAL: NAD, lying in bed comfortably. AAOx3, very calm and collected speech   NERVOUS SYSTEM: Speech clear. No deficits, CN 2-12 in tact. Unable to make out letters when asked to read, reports blurry vision as cause.    HEAD:  Atraumatic, Normocephalic  EYES: EOMI, PERRLA, conjunctiva and sclera clear  ENT: Moist mucous membranes  NECK: Supple, No JVD, normal thyroid  CHEST/LUNG: Clear to auscultation bilaterally; no rales, rhonchi, wheezing, or rubs. No unlabored respirations   HEART: Regular rate and rhythm; No murmurs, rubs, or gallops  ABDOMEN: Bowel sounds present; Soft, nontender, nondistended.   EXTREMITIES:  2+ Peripheral Pulses, brisk capillary refill. No clubbing, cyanosis, or edema  MSK: FROM all 4 extremities, full and equal strength  SKIN: Warm dry. No rashes or lesions                          14.1   7.35  )-----------( 220      ( 11 Oct 2024 06:28 )             40.3     10-11    132[L]  |  98  |  14  ----------------------------<  143[H]  4.2   |  21[L]  |  1.03    Ca    9.0      11 Oct 2024 06:28  Phos  4.2     10-11  Mg     1.8     10-11    TPro  7.8  /  Alb  4.3  /  TBili  0.4  /  DBili  x   /  AST  21  /  ALT  36  /  AlkPhos  50  10-10    LIVER FUNCTIONS - ( 10 Oct 2024 20:02 )  Alb: 4.3 g/dL / Pro: 7.8 g/dL / ALK PHOS: 50 U/L / ALT: 36 U/L DA / AST: 21 U/L / GGT: x               PT/INR - ( 10 Oct 2024 20:02 )   PT: 11.1 sec;   INR: 0.95 ratio         PTT - ( 10 Oct 2024 20:02 )  PTT:32.8 sec      CAPILLARY BLOOD GLUCOSE      POCT Blood Glucose.: 217 mg/dL (11 Oct 2024 11:48)  POCT Blood Glucose.: 181 mg/dL (11 Oct 2024 07:34)      RADIOLOGY & ADDITIONAL TESTS:                   PGY-1 Progress Note discussed with attending    PAGER #: [[phone]] TILL 5:00 PM  PLEASE CONTACT ON CALL TEAM:  - On Call Team (Please refer to Ranjeet) FROM 5:00 PM - 8:30PM  - Nightfloat Team FROM 8:30 -7:30 AM    INTERVAL HPI/OVERNIGHT EVENTS:   - No overnight events. Patient was seen and evaluated at bedside. Reports no symptoms other than blurry vision particularly in the right eye for 2 weeks which has worsened over 2-3 days. Denies HA, dizziness, N/V/D, chest pain, SOB, or weakness.      REVIEW OF SYSTEMS:  CONSTITUTIONAL: No fever, weight loss, or fatigue  RESPIRATORY: No cough, wheezing, chills or hemoptysis; No shortness of breath  CARDIOVASCULAR: No chest pain, palpitations, dizziness, or leg swelling  GASTROINTESTINAL: No abdominal pain. No nausea, vomiting, or hematemesis; No diarrhea or constipation.  GENITOURINARY: No dysuria or hematuria, urinary frequency  NEUROLOGICAL: No headaches, memory loss, loss of strength, numbness, or tremors  SKIN: No itching, burning, rashes, or lesions     MEDICATIONS  (STANDING):  amLODIPine   Tablet 5 milliGRAM(s) Oral daily  aspirin enteric coated 81 milliGRAM(s) Oral daily  atorvastatin 80 milliGRAM(s) Oral at bedtime  azithromycin   Tablet 500 milliGRAM(s) Oral daily  clopidogrel Tablet 75 milliGRAM(s) Oral daily  enoxaparin Injectable 40 milliGRAM(s) SubCutaneous every 24 hours  ezetimibe 10 milliGRAM(s) Oral daily  insulin lispro (ADMELOG) corrective regimen sliding scale   SubCutaneous three times a day before meals  insulin lispro (ADMELOG) corrective regimen sliding scale   SubCutaneous at bedtime  losartan 100 milliGRAM(s) Oral daily  metoprolol tartrate 50 milliGRAM(s) Oral two times a day  rifAMPin 600 milliGRAM(s) Oral daily    MEDICATIONS  (PRN):      Vital Signs Last 24 Hrs  T(C): 37.1 (11 Oct 2024 11:06), Max: 37.2 (10 Oct 2024 21:45)  T(F): 98.7 (11 Oct 2024 11:06), Max: 98.9 (10 Oct 2024 21:45)  HR: 83 (11 Oct 2024 11:06) (77 - 89)  BP: 127/79 (11 Oct 2024 11:06) (113/75 - 143/82)  BP(mean): --  RR: 18 (11 Oct 2024 11:06) (17 - 18)  SpO2: 99% (11 Oct 2024 11:06) (97% - 100%)    Parameters below as of 11 Oct 2024 11:06  Patient On (Oxygen Delivery Method): room air        PHYSICAL EXAMINATION:  GENERAL: NAD, lying in bed comfortably. AAOx3, very calm and collected speech   NERVOUS SYSTEM: Speech clear. No deficits, CN 2-12 in tact. Unable to make out letters when asked to read, reports blurry vision as cause.    HEAD:  Atraumatic, Normocephalic  EYES: EOMI, PERRLA, conjunctiva and sclera clear  ENT: Moist mucous membranes  NECK: Supple  CHEST/LUNG: Clear to auscultation bilaterally; no rales, rhonchi, wheezing, or rubs. No unlabored respirations   HEART: Regular rate and rhythm; No murmurs, rubs, or gallops  ABDOMEN: Bowel sounds present; Soft, nontender, nondistended.   EXTREMITIES:  2+ Peripheral Pulses, brisk capillary refill. No clubbing, cyanosis, or edema  MSK: FROM all 4 extremities, full and equal strength  SKIN: Warm dry. No rashes or lesions                          14.1   7.35  )-----------( 220      ( 11 Oct 2024 06:28 )             40.3     10-11    132[L]  |  98  |  14  ----------------------------<  143[H]  4.2   |  21[L]  |  1.03    Ca    9.0      11 Oct 2024 06:28  Phos  4.2     10-11  Mg     1.8     10-11    TPro  7.8  /  Alb  4.3  /  TBili  0.4  /  DBili  x   /  AST  21  /  ALT  36  /  AlkPhos  50  10-10    LIVER FUNCTIONS - ( 10 Oct 2024 20:02 )  Alb: 4.3 g/dL / Pro: 7.8 g/dL / ALK PHOS: 50 U/L / ALT: 36 U/L DA / AST: 21 U/L / GGT: x               PT/INR - ( 10 Oct 2024 20:02 )   PT: 11.1 sec;   INR: 0.95 ratio         PTT - ( 10 Oct 2024 20:02 )  PTT:32.8 sec      CAPILLARY BLOOD GLUCOSE    POCT Blood Glucose.: 217 mg/dL (11 Oct 2024 11:48)  POCT Blood Glucose.: 181 mg/dL (11 Oct 2024 07:34)      RADIOLOGY & ADDITIONAL TESTS:

## 2024-10-11 NOTE — PROGRESS NOTE ADULT - PROBLEM SELECTOR PLAN 1
p/w blurring of vision for 2 weeks, worse over last 2 days  fever 2 days ago, nausea +, denies photophobia, neck rigidity  states blurry vision in both eyes with the right eye being worse  no focal numbness or weakness  CT head: no ICH, mass or infarct  WBC 7.35  took ethambutol for past 9 months for MAC infection  DD: CVA less likely Vs optic neuritis 2/2 ethambutol vs meningitis (less likely in absence of signs of infection)  admitted to [facility]  A1c 7.4%  Lipid panel - chol 78; *; HDL 29*; Non HDL 49, LDL - error in calculation due to low cholesterol levels  -TSH (need to order?)  -f/u TTE with bubble study  -f/u MRI brain and orbit with contrast to evaluate optic nerve  -Neuro consulted, Dr. Sun following, f/u on recs p/w blurring of vision for 2 weeks, worse over last 2 days  fever 2 days ago, nausea +, denies photophobia, neck rigidity  states blurry vision in both eyes with the right eye being worse  no focal numbness or weakness  CT head: no ICH, mass or infarct  WBC 7.35  took ethambutol for past 9 months for MAC infection. Referred to ED from ophthalmology office for urgent treatment  Spoke with pt's o/p ophthalmologist Dr. Douglas who states that findings inconsistent with optic neuritis 2/2 ethambutol and neuro work up is needed  admitted to tele  hold ethambutol since possible offending agent  A1c 7.4%  Lipid panel - chol 78; *; HDL 29*; Non HDL 49, LDL - error in calculation due to low cholesterol levels  -TSH (need to order?)  -f/u TTE with bubble study  -f/u MRI brain and orbit with contrast to evaluate optic nerve performed, pending report  -Neuro consulted, Dr. Sun following, f/u on recs p/w blurring of vision for 2 weeks, worse over last 2 days w/ Rt > Lt  on ethambutol for past 9 months for MAC infection  NIHSS 0  CTH no ICH, mass or infarct   hba1c 7.6, lipid panel abnormal  as per o/p ophthalmologist Dr. Douglas findings inconsistent with optic neuritis 2/2 ethambutol   MR brain and orbit: neg for acute ischemia and optic neuritis, chronic involutional change  - tele monitoring  - f/u TSH  - hold ethambutol since possible offending agent  - pending TTE w/ bubble study  - neuro consulted, Dr. Sun pending recs  - f/u HIV and syphilis p/w blurring of vision for 2 weeks, worse over last 2 days w/ Rt > Lt  on ethambutol for past 9 months for MAC infection  NIHSS 0  CTH no ICH, mass or infarct   hba1c 7.6, lipid panel abnormal  as per o/p ophthalmologist Dr. De La Vega findings inconsistent w/ optic neuritis 2/2 ethambutol   MR brain and orbit: neg for acute ischemia and optic neuritis, chronic involutional change  - tele monitoring  - f/u TSH, HIV and syphilis  - hold ethambutol since possible offending agent  - pending TTE w/ bubble study  - neuro consulted, Dr. Sun pending recs

## 2024-10-11 NOTE — PROGRESS NOTE ADULT - PROBLEM SELECTOR PLAN 4
-on farxiga, linagliptin, metformin at home  -holding home meds  -started ISS On farxiga, linagliptin, metformin at home  Holding home meds  Started ISS  - f/u FSG hx of hld on zetia  lipid panel abnormal  - c/w high intensity statin and zetia hx of hld on zetia  lipid panel abnormal  - c/w high-intensity statin and zetia

## 2024-10-11 NOTE — PATIENT PROFILE ADULT - FALL HARM RISK - FALL HARM RISK
Subjective:     Patient ID: Felix Olmedo is a 75 y.o. male presenting for follow up for diabetic peripheral neuropathy. My last visit with the patient was on 10/30/20. He saw Dr. Colindres initially in August 2020. He has numbness and tingling of his lower extremities but it is not overly painful to him. He has trouble with his balance and does feel that this is getting worse. He mostly complains of cramping to his bilateral lower extremities. He takes Lipitor but says this problem did not start until he had been on the Lipitor for quite some time. He has not tried taking CoQ10. At his last visit he started gabapentin. I gave him instructions to take 1 tablet 3 times a day but he has only been taking it twice a day. He says he has had a little less cramping but otherwise has not noticed any changes since starting the medication.     He had an elevated IgA at 520 at his initial visit when Dr. Colindres ordered neuropathy labs. I checked his liver function at his last visit which was normal. Will plan to recheck the IgA level today.     History of Present Illness  The following portions of the patient's history were reviewed and updated as appropriate: allergies, current medications, past family history, past medical history, past social history, past surgical history and problem list.    Review of Systems   Constitutional: Positive for fatigue. Negative for chills and fever.   HENT: Positive for tinnitus. Negative for hearing loss and trouble swallowing.    Eyes: Negative for pain, redness and visual disturbance.   Respiratory: Positive for chest tightness. Negative for shortness of breath and wheezing.    Cardiovascular: Negative for chest pain, palpitations and leg swelling.   Gastrointestinal: Negative for diarrhea, nausea and vomiting.   Endocrine: Negative for cold intolerance, heat intolerance and polydipsia.   Genitourinary: Negative for decreased urine volume, difficulty urinating and urgency.    Musculoskeletal: Positive for gait problem. Negative for back pain, neck pain and neck stiffness.   Skin: Negative for color change, rash and wound.   Allergic/Immunologic: Negative for environmental allergies, food allergies and immunocompromised state.   Neurological: Positive for weakness and numbness. Negative for dizziness, tremors, seizures, syncope, facial asymmetry, speech difficulty, light-headedness and headaches.   Hematological: Negative for adenopathy. Does not bruise/bleed easily.   Psychiatric/Behavioral: Negative for confusion, decreased concentration and sleep disturbance. The patient is not nervous/anxious.         Objective:    Neurologic Exam  Mental status: Awake, alert, oriented to person place and time. Attention and concentration normal.  HCF: No aphasia, apraxia, or dysarthria. Recent and remote memory intact.  CN II-XII: Visual fields full without left inattention, eye movements intact without nystagmus or ptosis. Pupils equal, round, and reactive to light.  Light touch and pinprick intact over all 3 divisions of cranial nerve V. Facial muscles symmetric. Hearing intact to finger rub. Soft palate elevates symmetrically. Sternomastoid and trapezius strong. Tongue midline.   Motor: Normal tone and bulk in the upper and lower extremities. Mild weakness of the intrinsic muscles of the hand. Otherwise normal strength in the upper extremities. Mild weakness of ankle dorsiflexion as well as toe dorsiflexion and plantar flexion. Otherwise normal strength in BLE.   Reflexes: Areflexic in upper and lower extremities.   Sensory: Light touch reduced from the knees down bilaterally as well as pinprick. Vibration reduced at the right ankle and absent at the left ankle. Abnormal position sense in the left great toe.    Physical Exam  General: Obese white male in no acute distress.  HEENT: Normocephalic, no evidence of trauma.  Neck: Supple.  Heart: RRR, no murmurs.  Extremities: Moderate pedal edema  bilaterally.     Assessment/Plan:        His symptoms have improved slightly since starting the gabapentin. I am going to titrate his dose higher, up to 300 mg TID. Side effects reviewed. Prior to doing this though I did recommend he start a CoQ10 supplement. I am suspicious if some of this cramping may be related to the Lipitor and this may help without needing to increase the gabapentin.     He will f/u in 3 months, and is to call in the meantime with any problems.            No indicators present

## 2024-10-11 NOTE — PROGRESS NOTE ADULT - ASSESSMENT
64y/M, from home, with PMH of HTN, DM, CAD s/p stent X2 (last on Aug 24), HLD, MAC infection (taking Rifampin, Ethambutol and Azithro). Presents to ED with blurring of vision, started 2 weeks ago, acute in onset, worse over last 2-3 days. Patient went to opthalmology clinic and was referred to [facility] for emergent treatment but presented to [facility] since closer, Admitted to telemetry to r/o CVA vs optic neuritis 2/2 ethambutol. Pt's o/p ophthalmologist was contacted to discuss exam findings. ophthalmologist Dr. Douglas, who stated that findings from his exam were inconclusive was unlikely and that neuro work up is required. MRI brain and orbit with contrast  ordered to evaluate optic nerve and r/o acute ischemia.  64y M, from home, with PMH of HTN, DM, CAD s/p stent x2 (last on Aug 24), HLD, MAC infection (taking Rifampin, Ethambutol and Azithro). Presents to ED with blurring of vision, started 2 weeks ago, acute in onset, worse over last 2-3 days. Patient went to opthalmology clinic and was referred to [facility] for emergent treatment but presented to [facility] since closer, Admitted to telemetry to r/o CVA vs optic neuritis 2/2 ethambutol. Pt's o/p ophthalmologist was contacted to discuss exam findings. ophthalmologist Dr. Douglas, who stated that findings from his exam were inconclusive for optic neuritis and that neuro work up is required. MRI brain and orbit with contrast ordered to evaluate optic nerve and r/o acute ischemia.  64 M, PMHx HTN, DM, CAD s/p stent x2 ([ast on Aug 24], HLD, MAC infection [taking Rifampicin, Ethambutol and Azithro]. p/w blurring of vision, started 2 days ago, acute in onset, worse over last 2-3 days. Admitted to r/o CVA vs optic neuritis. MRI brain and orbit neg for optic neuritis and acute ischemia.

## 2024-10-11 NOTE — PATIENT PROFILE ADULT - LIVING ENVIRONMENT
Francy Quiroz is a 88 year old female presenting for a recheck of legs.  Medications verified, no changes.  Denies known Latex allergy or symptoms of Latex sensitivity.     no

## 2024-10-11 NOTE — PATIENT PROFILE ADULT - FALL HARM RISK - RISK INTERVENTIONS

## 2024-10-11 NOTE — PROGRESS NOTE ADULT - PROBLEM SELECTOR PLAN 2
on amlodipine-olmesartan, metorprolol at home  c/w home meds with holding parameter On amlodipine-olmesartan, metoprolol at home  While admitted, patient is being given Amlodipine 5md qd, Losartan 100mg qd, metoprolol tartrate 50mg bid Ongoing treatment for MAC with Rifampin, Ethambutol and Azithromycin  f/u with Dr Aydin Patel  Being evaluated for possible optic neuritis 2/2 ethambutol  Holding ethambutol for now  -c/w rifampin and azithro ongoing Tx for MAC with rifampin, ethambutol and azithromycin  follows up w/ Dr Aydin Patel  being evaluated for possible optic neuritis 2/2 ethambutol  - hold ethambutol for now  - c/w rifampin and fara

## 2024-10-11 NOTE — PROGRESS NOTE ADULT - PROBLEM SELECTOR PLAN 6
ongoing treatment for MAC with Rifampin, Ethambutol and Azithromycin  f/u with Dr Aydin Patel  ?optic neuritis 2/2 ethambutol  -holding ethanbutol for now  -c/w rifampin and azithro Ongoing treatment for MAC with Rifampin, Ethambutol and Azithromycin  f/u with Dr Aydin Patel  Being evaluated for possible optic neuritis 2/2 ethambutol  Holding ethambutol for now  -c/w rifampin and azithro h/o CAD s/p CABG stent x2 on ASA and plavix  - c/w aspirin and plavix

## 2024-10-11 NOTE — PROGRESS NOTE ADULT - ATTENDING COMMENTS
64 year old man was referred by Dr. Cline, Ophthalmologist because of blurred vision.   He has medical hx including CAD s/p PCI - most recently 2 months ago on DAPT, HTN, DM, HLD, Pulmonary EBENEZER on (Rifampin, Ethambutol, Azithromycin) hepatic steatosis noted on recent abdominal imaging with about 2 weeks of persistent blurry vision with no associated symptoms on ROS.   He was seen in the Eye clinic and sent to the [facility] ED after evaluation showed decreased vision of the right  but patient ended up in our facility.  He was admitted for CVA work up.     I was able to reach Dr. Cline by telephone to inquire as to whether he intended admission to an Ophthalmology service for optic neuritis.  He did not.  He examined the patient yesterday, and, although he has cataracts, he did not see changes in the optic nerve that were of concern.   Patient has previously been admitted to [facility] with ACS symptoms.  Last PCI was two months ago.    Patient is being treated for EBENEZER pneumonia by Pulmonologist.   .  Alert, cooperative, chronically-ill man in NAD  Vital Signs Last 24 Hrs  T(C): 36.5 (11 Oct 2024 16:12), Max: 37.2 (10 Oct 2024 21:45)  T(F): 97.7 (11 Oct 2024 16:12), Max: 98.9 (10 Oct 2024 21:45)  HR: 77 (11 Oct 2024 16:12) (77 - 89)  BP: 121/80 (11 Oct 2024 16:12) (113/75 - 143/82)  BP(mean): --  RR: 17 (11 Oct 2024 16:12) (17 - 18)  SpO2: 100% (11 Oct 2024 16:12) (97% - 100%)    Parameters below as of 11 Oct 2024 16:12  Patient On (Oxygen Delivery Method): room air  decreased vision affects patient' ability to access telephone number on cell phone  Lungs, bilateral air entry    < from: MR Head w/ IV Cont (10.11.24 @ 13:12) >    MRI ORBIT    Motion degraded imaging of the optic nerves.    Accounting for limits no evidence of optic neuritis. No intraorbital   lesions or mass effect.    BRAIN:    No acute cerebral ischemia, intracranial hemorrhages or space-occupying   lesions.    Chronic involutional changes.    < end of copied text >    IMP:  Two week history of blurred vision          No evidence of optic neuritis on ophthalmologist's exam or MR          no evidence of mass, bleed, or embolus          r/o occular syphilis          EBENEZER pneumonia, unclear underlying lung disease  Plan: Hold EMB          CXR          RPR          HIV          Neurology consultation 64 year old man was referred by Dr. Cline, Ophthalmologist because of blurred vision.   He has medical hx including CAD s/p PCI - most recently 2 months ago on DAPT, HTN, DM, HLD, Pulmonary EBENEZER on (Rifampin, Ethambutol, Azithromycin) hepatic steatosis noted on recent abdominal imaging with about 2 weeks of persistent blurry vision with no associated symptoms on ROS.   He was seen in the Eye clinic and sent to the [facility] ED after evaluation showed decreased vision of the right  but patient ended up in our facility.  He was admitted for CVA work up.     I was able to reach Dr. Cline by telephone to inquire as to whether he intended admission to an Ophthalmology service for optic neuritis.  He did not.  He examined the patient yesterday, and, although he has cataracts, he did not see changes in the optic nerve that were of concern.   Patient has previously been admitted to [facility] with ACS symptoms.  Last PCI was two months ago.    Patient is being treated for EBENEZER pneumonia by Pulmonologist.   .  Alert, cooperative, chronically-ill man in NAD  Vital Signs Last 24 Hrs  T(C): 36.5 (11 Oct 2024 16:12), Max: 37.2 (10 Oct 2024 21:45)  T(F): 97.7 (11 Oct 2024 16:12), Max: 98.9 (10 Oct 2024 21:45)  HR: 77 (11 Oct 2024 16:12) (77 - 89)  BP: 121/80 (11 Oct 2024 16:12) (113/75 - 143/82)  BP(mean): --  RR: 17 (11 Oct 2024 16:12) (17 - 18)  SpO2: 100% (11 Oct 2024 16:12) (97% - 100%)    Parameters below as of 11 Oct 2024 16:12  Patient On (Oxygen Delivery Method): room air  decreased vision affects patient' ability to access telephone number on cell phone  Lungs, bilateral air entry    < from: MR Head w/ IV Cont (10.11.24 @ 13:12) >    MRI ORBIT    Motion degraded imaging of the optic nerves.    Accounting for limits no evidence of optic neuritis. No intraorbital   lesions or mass effect.    BRAIN:    No acute cerebral ischemia, intracranial hemorrhages or space-occupying   lesions.    Chronic involutional changes.    < end of copied text >    IMP:  Two week history of blurred vision          No evidence of optic neuritis on ophthalmologist's exam or MR          no evidence of mass, bleed, or embolus          r/o occular syphilis          EBENEZER pneumonia, unclear underlying lung disease          ASCVD, s/p PCI          DM          HTN  Plan: Hold EMB          CXR          RPR          HIV          Neurology consultation

## 2024-10-11 NOTE — PROGRESS NOTE ADULT - PROBLEM SELECTOR PLAN 5
h/o CAD s/p CABG X2 (last on 07/24)  c/w aspirin and plavix h/o CAD s/p CABG stent x2   c/w aspirin and plavix hx of dm on farxiga, linagliptin, metformin at home  hold home PO meds  ISS  hba1c 7.4  - adjust ISS as indicated  - BS ACHS

## 2024-10-12 LAB
ALBUMIN SERPL ELPH-MCNC: 3.9 G/DL — SIGNIFICANT CHANGE UP (ref 3.5–5)
ALP SERPL-CCNC: 41 U/L — SIGNIFICANT CHANGE UP (ref 40–120)
ALT FLD-CCNC: 31 U/L DA — SIGNIFICANT CHANGE UP (ref 10–60)
ANION GAP SERPL CALC-SCNC: 9 MMOL/L — SIGNIFICANT CHANGE UP (ref 5–17)
AST SERPL-CCNC: 21 U/L — SIGNIFICANT CHANGE UP (ref 10–40)
BILIRUB SERPL-MCNC: 0.4 MG/DL — SIGNIFICANT CHANGE UP (ref 0.2–1.2)
BUN SERPL-MCNC: 16 MG/DL — SIGNIFICANT CHANGE UP (ref 7–18)
CALCIUM SERPL-MCNC: 8.5 MG/DL — SIGNIFICANT CHANGE UP (ref 8.4–10.5)
CHLORIDE SERPL-SCNC: 99 MMOL/L — SIGNIFICANT CHANGE UP (ref 96–108)
CO2 SERPL-SCNC: 25 MMOL/L — SIGNIFICANT CHANGE UP (ref 22–31)
CREAT SERPL-MCNC: 0.85 MG/DL — SIGNIFICANT CHANGE UP (ref 0.5–1.3)
EGFR: 96 ML/MIN/1.73M2 — SIGNIFICANT CHANGE UP
GLUCOSE BLDC GLUCOMTR-MCNC: 190 MG/DL — HIGH (ref 70–99)
GLUCOSE BLDC GLUCOMTR-MCNC: 240 MG/DL — HIGH (ref 70–99)
GLUCOSE BLDC GLUCOMTR-MCNC: 267 MG/DL — HIGH (ref 70–99)
GLUCOSE BLDC GLUCOMTR-MCNC: 290 MG/DL — HIGH (ref 70–99)
GLUCOSE SERPL-MCNC: 185 MG/DL — HIGH (ref 70–99)
HCT VFR BLD CALC: 39.2 % — SIGNIFICANT CHANGE UP (ref 39–50)
HGB BLD-MCNC: 13.6 G/DL — SIGNIFICANT CHANGE UP (ref 13–17)
HIV 1+2 AB+HIV1 P24 AG SERPL QL IA: SIGNIFICANT CHANGE UP
MAGNESIUM SERPL-MCNC: 1.9 MG/DL — SIGNIFICANT CHANGE UP (ref 1.6–2.6)
MCHC RBC-ENTMCNC: 30.6 PG — SIGNIFICANT CHANGE UP (ref 27–34)
MCHC RBC-ENTMCNC: 34.7 GM/DL — SIGNIFICANT CHANGE UP (ref 32–36)
MCV RBC AUTO: 88.3 FL — SIGNIFICANT CHANGE UP (ref 80–100)
NRBC # BLD: 0 /100 WBCS — SIGNIFICANT CHANGE UP (ref 0–0)
PHOSPHATE SERPL-MCNC: 3.7 MG/DL — SIGNIFICANT CHANGE UP (ref 2.5–4.5)
PLATELET # BLD AUTO: 212 K/UL — SIGNIFICANT CHANGE UP (ref 150–400)
POTASSIUM SERPL-MCNC: 4.1 MMOL/L — SIGNIFICANT CHANGE UP (ref 3.5–5.3)
POTASSIUM SERPL-SCNC: 4.1 MMOL/L — SIGNIFICANT CHANGE UP (ref 3.5–5.3)
PROT SERPL-MCNC: 6.8 G/DL — SIGNIFICANT CHANGE UP (ref 6–8.3)
RBC # BLD: 4.44 M/UL — SIGNIFICANT CHANGE UP (ref 4.2–5.8)
RBC # FLD: 12.8 % — SIGNIFICANT CHANGE UP (ref 10.3–14.5)
SODIUM SERPL-SCNC: 133 MMOL/L — LOW (ref 135–145)
T PALLIDUM AB TITR SER: NEGATIVE — SIGNIFICANT CHANGE UP
TSH SERPL-MCNC: 1.1 UU/ML — SIGNIFICANT CHANGE UP (ref 0.34–4.82)
WBC # BLD: 6.32 K/UL — SIGNIFICANT CHANGE UP (ref 3.8–10.5)
WBC # FLD AUTO: 6.32 K/UL — SIGNIFICANT CHANGE UP (ref 3.8–10.5)

## 2024-10-12 PROCEDURE — 99232 SBSQ HOSP IP/OBS MODERATE 35: CPT | Mod: GC

## 2024-10-12 RX ORDER — CETIRIZINE HYDROCHLORIDE 10 MG/1
5 TABLET ORAL ONCE
Refills: 0 | Status: COMPLETED | OUTPATIENT
Start: 2024-10-12 | End: 2024-10-12

## 2024-10-12 RX ORDER — 5-HYDROXYTRYPTOPHAN (5-HTP) 100 MG
3 TABLET,DISINTEGRATING ORAL ONCE
Refills: 0 | Status: COMPLETED | OUTPATIENT
Start: 2024-10-12 | End: 2024-10-12

## 2024-10-12 RX ADMIN — Medication 50 MILLIGRAM(S): at 17:39

## 2024-10-12 RX ADMIN — ATORVASTATIN CALCIUM 80 MILLIGRAM(S): 10 TABLET, FILM COATED ORAL at 21:45

## 2024-10-12 RX ADMIN — Medication 5 MILLIGRAM(S): at 06:31

## 2024-10-12 RX ADMIN — Medication 3: at 17:38

## 2024-10-12 RX ADMIN — LOSARTAN POTASSIUM 100 MILLIGRAM(S): 100 TABLET, FILM COATED ORAL at 06:31

## 2024-10-12 RX ADMIN — AZITHROMYCIN 500 MILLIGRAM(S): 250 TABLET, FILM COATED ORAL at 11:30

## 2024-10-12 RX ADMIN — Medication 75 MILLIGRAM(S): at 11:31

## 2024-10-12 RX ADMIN — Medication 1: at 08:08

## 2024-10-12 RX ADMIN — Medication 3: at 12:26

## 2024-10-12 RX ADMIN — CETIRIZINE HYDROCHLORIDE 5 MILLIGRAM(S): 10 TABLET ORAL at 18:28

## 2024-10-12 RX ADMIN — Medication 50 MILLIGRAM(S): at 06:32

## 2024-10-12 RX ADMIN — ENOXAPARIN SODIUM 40 MILLIGRAM(S): 150 INJECTION SUBCUTANEOUS at 17:40

## 2024-10-12 RX ADMIN — EZETIMIBE 10 MILLIGRAM(S): 10 TABLET ORAL at 11:30

## 2024-10-12 RX ADMIN — Medication 3 MILLIGRAM(S): at 21:45

## 2024-10-12 RX ADMIN — Medication 81 MILLIGRAM(S): at 11:30

## 2024-10-12 NOTE — PROGRESS NOTE ADULT - SUBJECTIVE AND OBJECTIVE BOX
PGY-1 Progress Note discussed with attending      PLEASE CONTACT ON CALL TEAM:  - On Call Team (Please refer to Ranjeet) FROM 5:00 PM - 8:30PM  - Nightfloat Team FROM 8:30 -7:30 AM    INTERVAL HPI/OVERNIGHT EVENTS: No acute events overnight.  Patient examined at bedside this AM.  Patient denies acute complaints. Denies any changes in vision, SOB. Reports that he is sneezing more frequently with rhinorrhea.       REVIEW OF SYSTEMS:  CONSTITUTIONAL: No fever, weight loss, or fatigue  RESPIRATORY: No cough, wheezing, chills or hemoptysis; No shortness of breath  CARDIOVASCULAR: No chest pain, palpitations, dizziness, or leg swelling  GASTROINTESTINAL: No abdominal pain. No nausea, vomiting, or hematemesis; No diarrhea or constipation. No melena or hematochezia.  GENITOURINARY: No dysuria or hematuria, urinary frequency  NEUROLOGICAL: No headaches, memory loss, loss of strength, numbness, or tremors  SKIN: No itching, burning, rashes, or lesions     MEDICATIONS  (STANDING):  amLODIPine   Tablet 5 milliGRAM(s) Oral daily  aspirin enteric coated 81 milliGRAM(s) Oral daily  atorvastatin 80 milliGRAM(s) Oral at bedtime  azithromycin   Tablet 500 milliGRAM(s) Oral daily  clopidogrel Tablet 75 milliGRAM(s) Oral daily  enoxaparin Injectable 40 milliGRAM(s) SubCutaneous every 24 hours  ezetimibe 10 milliGRAM(s) Oral daily  insulin lispro (ADMELOG) corrective regimen sliding scale   SubCutaneous at bedtime  insulin lispro (ADMELOG) corrective regimen sliding scale   SubCutaneous three times a day before meals  losartan 100 milliGRAM(s) Oral daily  metoprolol tartrate 50 milliGRAM(s) Oral two times a day  rifAMPin 600 milliGRAM(s) Oral daily    MEDICATIONS  (PRN):      Vital Signs Last 24 Hrs  T(C): 36.3 (12 Oct 2024 07:10), Max: 37.1 (11 Oct 2024 11:06)  T(F): 97.3 (12 Oct 2024 07:10), Max: 98.7 (11 Oct 2024 11:06)  HR: 72 (12 Oct 2024 07:10) (72 - 86)  BP: 139/101 (12 Oct 2024 07:10) (113/82 - 139/101)  BP(mean): --  RR: 17 (12 Oct 2024 07:10) (17 - 18)  SpO2: 99% (12 Oct 2024 07:10) (97% - 100%)    Parameters below as of 12 Oct 2024 07:10  Patient On (Oxygen Delivery Method): room air        PHYSICAL EXAMINATION:  GENERAL: NAD  HEAD:  Atraumatic, Normocephalic  EYES:  conjunctiva and sclera clear  NECK: Supple, No JVD, Normal thyroid  CHEST/LUNG: Clear to auscultation. Clear to percussion bilaterally; No rales, rhonchi, wheezing, or rubs  HEART: Regular rate and rhythm; No murmurs, rubs, or gallops  ABDOMEN: Soft, Nontender, Nondistended; Bowel sounds present, no pain or masses on palpation  NERVOUS SYSTEM:  Alert & Oriented X3  EXTREMITIES:  2+ Peripheral Pulses, No clubbing, cyanosis, or edema  SKIN: warm dry                          13.6   6.32  )-----------( 212      ( 12 Oct 2024 05:59 )             39.2     10-12    133[L]  |  99  |  16  ----------------------------<  185[H]  4.1   |  25  |  0.85    Ca    8.5      12 Oct 2024 05:59  Phos  3.7     10-12  Mg     1.9     10-12    TPro  6.8  /  Alb  3.9  /  TBili  0.4  /  DBili  x   /  AST  21  /  ALT  31  /  AlkPhos  41  10-12    LIVER FUNCTIONS - ( 12 Oct 2024 05:59 )  Alb: 3.9 g/dL / Pro: 6.8 g/dL / ALK PHOS: 41 U/L / ALT: 31 U/L DA / AST: 21 U/L / GGT: x               PT/INR - ( 10 Oct 2024 20:02 )   PT: 11.1 sec;   INR: 0.95 ratio         PTT - ( 10 Oct 2024 20:02 )  PTT:32.8 sec    I&O's Summary          CAPILLARY BLOOD GLUCOSE      RADIOLOGY & ADDITIONAL TESTS:

## 2024-10-12 NOTE — PROGRESS NOTE ADULT - PROBLEM SELECTOR PLAN 5
hx of dm on farxiga, linagliptin, metformin at home  hold home PO meds  ISS  hba1c 7.4  - adjust ISS as indicated  - BS ACHS

## 2024-10-12 NOTE — PROGRESS NOTE ADULT - ASSESSMENT
64 M, PMHx HTN, DM, CAD s/p stent x2 ([ast on Aug 24], HLD, MAC infection [taking Rifampicin, Ethambutol and Azithro]. p/w blurring of vision, started 2 days ago, acute in onset, worse over last 2-3 days. Admitted to r/o CVA vs optic neuritis. MRI brain and orbit neg for optic neuritis and acute ischemia.

## 2024-10-12 NOTE — PROGRESS NOTE ADULT - ATTENDING COMMENTS
64 year old man was referred by Dr. Cline, Ophthalmologist because of blurred vision.   He has medical hx including CAD s/p PCI - most recently 2 months ago on DAPT, HTN, DM, HLD, Pulmonary EBENEZER on (Rifampin, Ethambutol, Azithromycin) hepatic steatosis noted on recent abdominal imaging with about 2 weeks of persistent blurry vision with no associated symptoms on ROS.   He was seen in the Eye clinic and sent to the [facility] ED after evaluation showed decreased vision of the right  but patient ended up in our facility.  He was admitted for CVA work up.     I was able to reach Dr. Cline by telephone to inquire as to whether he intended admission to an Ophthalmology service for optic neuritis.  He did not.  He examined the patient yesterday, and, although he has cataracts, he did not see changes in the optic nerve that were of concern.   Patient has previously been admitted to [facility] with ACS symptoms.  Last PCI was two months ago.    Patient is being treated for EBENEZER pneumonia by Pulmonologist.   .  Alert, cooperative, chronically-ill man in NAD  Vital Signs Last 24 Hrs  T(C): 36.5 (12 Oct 2024 16:05), Max: 36.8 (12 Oct 2024 11:03)  T(F): 97.7 (12 Oct 2024 16:05), Max: 98.2 (12 Oct 2024 11:03)  HR: 73 (12 Oct 2024 16:05) (72 - 86)  BP: 116/72 (12 Oct 2024 16:05) (108/73 - 139/101)  BP(mean): --  RR: 17 (12 Oct 2024 16:05) (17 - 18)  SpO2: 99% (12 Oct 2024 16:05) (97% - 99%)    Parameters below as of 12 Oct 2024 16:05  Patient On (Oxygen Delivery Method): room air    Blurred vision, right, sees, well, left  Lungs, bilateral air entry    < from: MR Head w/ IV Cont (10.11.24 @ 13:12) >    MRI ORBIT    Motion degraded imaging of the optic nerves.    Accounting for limits no evidence of optic neuritis. No intraorbital   lesions or mass effect.    BRAIN:    No acute cerebral ischemia, intracranial hemorrhages or space-occupying   lesions.    Chronic involutional changes.    < end of copied text >    < from: Xray Chest 1 View-PORTABLE IMMEDIATE (Xray Chest 1 View-PORTABLE IMMEDIATE .) (10.11.24 @ 16:47) >    Heart magnified by technique. Elongated aorta noted.    Lungs are clear and chest is similar to January 10 this year.    IMPRESSION: No acute finding or change.    < end of copied text >  CAPILLARY BLOOD GLUCOSE      POCT Blood Glucose.: 267 mg/dL (12 Oct 2024 16:43)  POCT Blood Glucose.: 290 mg/dL (12 Oct 2024 11:49)  POCT Blood Glucose.: 190 mg/dL (12 Oct 2024 07:26)  POCT Blood Glucose.: 248 mg/dL (11 Oct 2024 21:16)                          13.6   6.32  )-----------( 212      ( 12 Oct 2024 05:59 )             39.2   10-12    133[L]  |  99  |  16  ----------------------------<  185[H]  4.1   |  25  |  0.85    Ca    8.5      12 Oct 2024 05:59  Phos  3.7     10-12  Mg     1.9     10-12    TPro  6.8  /  Alb  3.9  /  TBili  0.4  /  DBili  x   /  AST  21  /  ALT  31  /  AlkPhos  41  10-12      IMP:  Two week history of blurred vision          No evidence of optic neuritis on ophthalmologist's exam or MR          no evidence of mass, bleed, or embolus          r/o occular syphilis          EBENEZER pneumonia, unclear underlying lung disease          ASCVD, s/p PCI          DM, needs better control          HTN  Plan: Hold EMB          CXR, reviewed, normal          RPR, pending          HIV, pending          Neurology consultation

## 2024-10-12 NOTE — PROGRESS NOTE ADULT - PROBLEM SELECTOR PLAN 1
p/w blurring of vision for 2 weeks, worse over last 2 days w/ Rt > Lt  on ethambutol for past 9 months for MAC infection  NIHSS 0  CTH no ICH, mass or infarct   hba1c 7.6, lipid panel abnormal  as per o/p ophthalmologist Dr. De La Vega findings inconsistent w/ optic neuritis 2/2 ethambutol   MR brain and orbit: neg for acute ischemia and optic neuritis, chronic involutional change  TSH: 1.1 nl  TTE: EF 71. mild (grade 1) left ventricular diastolic dysfunction  - tele monitoring  - f/u HIV and syphilis  - hold ethambutol since possible offending agent  - neuro consulted, Dr. Sun pending recs

## 2024-10-12 NOTE — PROGRESS NOTE ADULT - PROBLEM SELECTOR PLAN 3
hx of htn on amlodipine-olmesartan, metoprolol  - c/w home meds and losartan [interchange for olmesartan]

## 2024-10-12 NOTE — PROGRESS NOTE ADULT - PROBLEM SELECTOR PLAN 2
ongoing Tx for MAC with rifampin, ethambutol and azithromycin  follows up w/ Dr Aydin Patel  being evaluated for possible optic neuritis 2/2 ethambutol  - hold ethambutol for now  - c/w rifampin and fara

## 2024-10-13 LAB
ALBUMIN SERPL ELPH-MCNC: 3.8 G/DL — SIGNIFICANT CHANGE UP (ref 3.5–5)
ALP SERPL-CCNC: 37 U/L — LOW (ref 40–120)
ALT FLD-CCNC: 34 U/L DA — SIGNIFICANT CHANGE UP (ref 10–60)
ANION GAP SERPL CALC-SCNC: 7 MMOL/L — SIGNIFICANT CHANGE UP (ref 5–17)
AST SERPL-CCNC: 19 U/L — SIGNIFICANT CHANGE UP (ref 10–40)
BILIRUB SERPL-MCNC: 0.3 MG/DL — SIGNIFICANT CHANGE UP (ref 0.2–1.2)
BUN SERPL-MCNC: 16 MG/DL — SIGNIFICANT CHANGE UP (ref 7–18)
CALCIUM SERPL-MCNC: 8.9 MG/DL — SIGNIFICANT CHANGE UP (ref 8.4–10.5)
CHLORIDE SERPL-SCNC: 102 MMOL/L — SIGNIFICANT CHANGE UP (ref 96–108)
CO2 SERPL-SCNC: 23 MMOL/L — SIGNIFICANT CHANGE UP (ref 22–31)
CREAT SERPL-MCNC: 0.83 MG/DL — SIGNIFICANT CHANGE UP (ref 0.5–1.3)
EGFR: 97 ML/MIN/1.73M2 — SIGNIFICANT CHANGE UP
GLUCOSE BLDC GLUCOMTR-MCNC: 200 MG/DL — HIGH (ref 70–99)
GLUCOSE BLDC GLUCOMTR-MCNC: 216 MG/DL — HIGH (ref 70–99)
GLUCOSE BLDC GLUCOMTR-MCNC: 239 MG/DL — HIGH (ref 70–99)
GLUCOSE BLDC GLUCOMTR-MCNC: 343 MG/DL — HIGH (ref 70–99)
GLUCOSE SERPL-MCNC: 173 MG/DL — HIGH (ref 70–99)
HCT VFR BLD CALC: 40.2 % — SIGNIFICANT CHANGE UP (ref 39–50)
HGB BLD-MCNC: 14 G/DL — SIGNIFICANT CHANGE UP (ref 13–17)
MAGNESIUM SERPL-MCNC: 1.8 MG/DL — SIGNIFICANT CHANGE UP (ref 1.6–2.6)
MCHC RBC-ENTMCNC: 30.8 PG — SIGNIFICANT CHANGE UP (ref 27–34)
MCHC RBC-ENTMCNC: 34.8 GM/DL — SIGNIFICANT CHANGE UP (ref 32–36)
MCV RBC AUTO: 88.5 FL — SIGNIFICANT CHANGE UP (ref 80–100)
NRBC # BLD: 0 /100 WBCS — SIGNIFICANT CHANGE UP (ref 0–0)
PHOSPHATE SERPL-MCNC: 4.1 MG/DL — SIGNIFICANT CHANGE UP (ref 2.5–4.5)
PLATELET # BLD AUTO: 216 K/UL — SIGNIFICANT CHANGE UP (ref 150–400)
POTASSIUM SERPL-MCNC: 4.1 MMOL/L — SIGNIFICANT CHANGE UP (ref 3.5–5.3)
POTASSIUM SERPL-SCNC: 4.1 MMOL/L — SIGNIFICANT CHANGE UP (ref 3.5–5.3)
PROT SERPL-MCNC: 6.9 G/DL — SIGNIFICANT CHANGE UP (ref 6–8.3)
RBC # BLD: 4.54 M/UL — SIGNIFICANT CHANGE UP (ref 4.2–5.8)
RBC # FLD: 13 % — SIGNIFICANT CHANGE UP (ref 10.3–14.5)
SODIUM SERPL-SCNC: 132 MMOL/L — LOW (ref 135–145)
WBC # BLD: 5.04 K/UL — SIGNIFICANT CHANGE UP (ref 3.8–10.5)
WBC # FLD AUTO: 5.04 K/UL — SIGNIFICANT CHANGE UP (ref 3.8–10.5)

## 2024-10-13 PROCEDURE — 99232 SBSQ HOSP IP/OBS MODERATE 35: CPT | Mod: GC

## 2024-10-13 RX ORDER — CETIRIZINE HYDROCHLORIDE 10 MG/1
5 TABLET ORAL ONCE
Refills: 0 | Status: COMPLETED | OUTPATIENT
Start: 2024-10-13 | End: 2024-10-14

## 2024-10-13 RX ADMIN — Medication 1: at 08:11

## 2024-10-13 RX ADMIN — Medication 50 MILLIGRAM(S): at 18:35

## 2024-10-13 RX ADMIN — Medication 2: at 17:19

## 2024-10-13 RX ADMIN — Medication 5 MILLIGRAM(S): at 05:23

## 2024-10-13 RX ADMIN — ATORVASTATIN CALCIUM 80 MILLIGRAM(S): 10 TABLET, FILM COATED ORAL at 21:24

## 2024-10-13 RX ADMIN — ENOXAPARIN SODIUM 40 MILLIGRAM(S): 150 INJECTION SUBCUTANEOUS at 18:35

## 2024-10-13 RX ADMIN — Medication 4: at 12:00

## 2024-10-13 RX ADMIN — AZITHROMYCIN 500 MILLIGRAM(S): 250 TABLET, FILM COATED ORAL at 12:01

## 2024-10-13 RX ADMIN — EZETIMIBE 10 MILLIGRAM(S): 10 TABLET ORAL at 12:02

## 2024-10-13 RX ADMIN — Medication 50 MILLIGRAM(S): at 05:23

## 2024-10-13 RX ADMIN — LOSARTAN POTASSIUM 100 MILLIGRAM(S): 100 TABLET, FILM COATED ORAL at 05:23

## 2024-10-13 RX ADMIN — Medication 75 MILLIGRAM(S): at 12:01

## 2024-10-13 RX ADMIN — Medication 81 MILLIGRAM(S): at 12:01

## 2024-10-13 NOTE — PROGRESS NOTE ADULT - SUBJECTIVE AND OBJECTIVE BOX
PGY-1 Progress Note discussed with attending    PAGER #: [[phone]] TILL 5:00 PM  PLEASE CONTACT ON CALL TEAM:  - On Call Team (Please refer to Ranjeet) FROM 5:00 PM - 8:30PM  - Nightfloat Team FROM 8:30 -7:30 AM    INTERVAL HPI/OVERNIGHT EVENTS:   -     REVIEW OF SYSTEMS:  CONSTITUTIONAL: No fever, weight loss, or fatigue  RESPIRATORY: No cough, wheezing, chills or hemoptysis; No shortness of breath  CARDIOVASCULAR: No chest pain, palpitations, dizziness, or leg swelling  GASTROINTESTINAL: No abdominal pain. No nausea, vomiting, or hematemesis; No diarrhea or constipation. No melena or hematochezia.  GENITOURINARY: No dysuria or hematuria, urinary frequency  NEUROLOGICAL: No headaches, memory loss, loss of strength, numbness, or tremors  SKIN: No itching, burning, rashes, or lesions     MEDICATIONS  (STANDING):  amLODIPine   Tablet 5 milliGRAM(s) Oral daily  aspirin enteric coated 81 milliGRAM(s) Oral daily  atorvastatin 80 milliGRAM(s) Oral at bedtime  azithromycin   Tablet 500 milliGRAM(s) Oral daily  clopidogrel Tablet 75 milliGRAM(s) Oral daily  enoxaparin Injectable 40 milliGRAM(s) SubCutaneous every 24 hours  ezetimibe 10 milliGRAM(s) Oral daily  insulin lispro (ADMELOG) corrective regimen sliding scale   SubCutaneous at bedtime  insulin lispro (ADMELOG) corrective regimen sliding scale   SubCutaneous three times a day before meals  losartan 100 milliGRAM(s) Oral daily  metoprolol tartrate 50 milliGRAM(s) Oral two times a day  rifAMPin 600 milliGRAM(s) Oral daily    MEDICATIONS  (PRN):      Vital Signs Last 24 Hrs  T(C): 36.5 (13 Oct 2024 07:08), Max: 36.8 (12 Oct 2024 11:03)  T(F): 97.7 (13 Oct 2024 07:08), Max: 98.2 (12 Oct 2024 11:03)  HR: 72 (13 Oct 2024 07:08) (71 - 80)  BP: 118/83 (13 Oct 2024 07:08) (108/73 - 121/79)  BP(mean): --  RR: 18 (13 Oct 2024 07:08) (17 - 18)  SpO2: 97% (13 Oct 2024 07:08) (96% - 100%)    Parameters below as of 13 Oct 2024 07:08  Patient On (Oxygen Delivery Method): room air        PHYSICAL EXAMINATION:  GENERAL: NAD, lying in bed comfortably. AAOx  NERVOUS SYSTEM: Speech clear. No deficits   HEAD:  Atraumatic, Normocephalic  EYES: EOMI, PERRLA, conjunctiva and sclera clear  ENT: Moist mucous membranes  NECK: Supple, No JVD, normal thyroid  CHEST/LUNG: Clear to auscultation bilaterally; no rales, rhonchi, wheezing, or rubs. No unlabored respirations   HEART: Regular rate and rhythm; No murmurs, rubs, or gallops  ABDOMEN: Bowel sounds present; Soft, nontender, nondistended.   EXTREMITIES:  2+ Peripheral Pulses, brisk capillary refill. No clubbing, cyanosis, or edema  MSK: FROM all 4 extremities, full and equal strength  SKIN: Warm dry. No rashes or lesions                          14.0   5.04  )-----------( 216      ( 13 Oct 2024 05:25 )             40.2     10-13    132[L]  |  102  |  16  ----------------------------<  173[H]  4.1   |  23  |  0.83    Ca    8.9      13 Oct 2024 05:25  Phos  4.1     10-13  Mg     1.8     10-13    TPro  6.9  /  Alb  3.8  /  TBili  0.3  /  DBili  x   /  AST  19  /  ALT  34  /  AlkPhos  37[L]  10-13    LIVER FUNCTIONS - ( 13 Oct 2024 05:25 )  Alb: 3.8 g/dL / Pro: 6.9 g/dL / ALK PHOS: 37 U/L / ALT: 34 U/L DA / AST: 19 U/L / GGT: x                     CAPILLARY BLOOD GLUCOSE      POCT Blood Glucose.: 200 mg/dL (13 Oct 2024 07:53)  POCT Blood Glucose.: 240 mg/dL (12 Oct 2024 21:33)  POCT Blood Glucose.: 267 mg/dL (12 Oct 2024 16:43)  POCT Blood Glucose.: 290 mg/dL (12 Oct 2024 11:49)      RADIOLOGY & ADDITIONAL TESTS:                   PGY-1 Progress Note discussed with attending    PAGER #: [[phone]] TILL 5:00 PM  PLEASE CONTACT ON CALL TEAM:  - On Call Team (Please refer to Ranjeet) FROM 5:00 PM - 8:30PM  - Nightfloat Team FROM 8:30 -7:30 AM    INTERVAL HPI/OVERNIGHT EVENTS:   - No acute events overnight.  Patient examined at bedside this AM.  Patient still complains of blurry vision in Rt eye that has not improved. Otherwise, denies any acute complaints.     REVIEW OF SYSTEMS:  CONSTITUTIONAL: No fever, weight loss, or fatigue  RESPIRATORY: No cough, wheezing, chills or hemoptysis; No shortness of breath  CARDIOVASCULAR: No chest pain, palpitations, dizziness, or leg swelling  GASTROINTESTINAL: No abdominal pain. No nausea, vomiting, or hematemesis; No diarrhea or constipation.   GENITOURINARY: No dysuria or hematuria, urinary frequency  NEUROLOGICAL: No headaches, memory loss, loss of strength, numbness, or tremors. Blurry vision Rt.   SKIN: No itching, burning, rashes, or lesions     MEDICATIONS  (STANDING):  amLODIPine   Tablet 5 milliGRAM(s) Oral daily  aspirin enteric coated 81 milliGRAM(s) Oral daily  atorvastatin 80 milliGRAM(s) Oral at bedtime  azithromycin   Tablet 500 milliGRAM(s) Oral daily  clopidogrel Tablet 75 milliGRAM(s) Oral daily  enoxaparin Injectable 40 milliGRAM(s) SubCutaneous every 24 hours  ezetimibe 10 milliGRAM(s) Oral daily  insulin lispro (ADMELOG) corrective regimen sliding scale   SubCutaneous at bedtime  insulin lispro (ADMELOG) corrective regimen sliding scale   SubCutaneous three times a day before meals  losartan 100 milliGRAM(s) Oral daily  metoprolol tartrate 50 milliGRAM(s) Oral two times a day  rifAMPin 600 milliGRAM(s) Oral daily    MEDICATIONS  (PRN):      Vital Signs Last 24 Hrs  T(C): 36.5 (13 Oct 2024 07:08), Max: 36.8 (12 Oct 2024 11:03)  T(F): 97.7 (13 Oct 2024 07:08), Max: 98.2 (12 Oct 2024 11:03)  HR: 72 (13 Oct 2024 07:08) (71 - 80)  BP: 118/83 (13 Oct 2024 07:08) (108/73 - 121/79)  BP(mean): --  RR: 18 (13 Oct 2024 07:08) (17 - 18)  SpO2: 97% (13 Oct 2024 07:08) (96% - 100%)    Parameters below as of 13 Oct 2024 07:08  Patient On (Oxygen Delivery Method): room air        PHYSICAL EXAMINATION:  GENERAL: NAD, lying in bed comfortably. AAOx3, very calm and collected speech   NERVOUS SYSTEM: Speech clear. No deficits.   HEAD:  Atraumatic, Normocephalic  EYES: EOMI, PERRLA, conjunctiva and sclera clear  ENT: Moist mucous membranes  NECK: Supple  CHEST/LUNG: Clear to auscultation bilaterally; no rales, rhonchi, wheezing, or rubs. No unlabored respirations   HEART: Regular rate and rhythm; No murmurs, rubs, or gallops  ABDOMEN: Bowel sounds present; Soft, nontender, nondistended.   EXTREMITIES:  2+ Peripheral Pulses, brisk capillary refill. No clubbing, cyanosis, or edema  MSK: FROM all 4 extremities, full and equal strength  SKIN: Warm dry. No rashes or lesions                          14.0   5.04  )-----------( 216      ( 13 Oct 2024 05:25 )             40.2     10-13    132[L]  |  102  |  16  ----------------------------<  173[H]  4.1   |  23  |  0.83    Ca    8.9      13 Oct 2024 05:25  Phos  4.1     10-13  Mg     1.8     10-13    TPro  6.9  /  Alb  3.8  /  TBili  0.3  /  DBili  x   /  AST  19  /  ALT  34  /  AlkPhos  37[L]  10-13    LIVER FUNCTIONS - ( 13 Oct 2024 05:25 )  Alb: 3.8 g/dL / Pro: 6.9 g/dL / ALK PHOS: 37 U/L / ALT: 34 U/L DA / AST: 19 U/L / GGT: x                     CAPILLARY BLOOD GLUCOSE      POCT Blood Glucose.: 200 mg/dL (13 Oct 2024 07:53)  POCT Blood Glucose.: 240 mg/dL (12 Oct 2024 21:33)  POCT Blood Glucose.: 267 mg/dL (12 Oct 2024 16:43)  POCT Blood Glucose.: 290 mg/dL (12 Oct 2024 11:49)      RADIOLOGY & ADDITIONAL TESTS:

## 2024-10-13 NOTE — PROGRESS NOTE ADULT - ATTENDING COMMENTS
64 year old man was referred by Dr. Cline, Ophthalmologist because of blurred vision.   He has medical hx including CAD s/p PCI - most recently 2 months ago on DAPT, HTN, DM, HLD, Pulmonary EBENEZER on (Rifampin, Ethambutol, Azithromycin) hepatic steatosis noted on recent abdominal imaging with about 2 weeks of persistent blurry vision with no associated symptoms on ROS.   He was seen in the Eye clinic and sent to the [facility] ED after evaluation showed decreased vision of the right  but patient ended up in our facility.  He was admitted for CVA work up.   I was able to reach Dr. Cline by telephone to inquire as to whether he intended admission to an Ophthalmology service for optic neuritis.  He did not.  He examined the patient yesterday, and, although he has cataracts, he did not see changes in the optic nerve that were of concern.   Patient has previously been admitted to [facility] with ACS symptoms.  Last PCI was two months ago.    Patient is being treated for EBENEZER pneumonia by Pulmonologist.  Alert, cooperative, chronically-ill man in NAD  Vital Signs Last 24 Hrs  T(C): 36.3 (13 Oct 2024 11:03), Max: 36.5 (12 Oct 2024 16:05)  T(F): 97.3 (13 Oct 2024 11:03), Max: 97.7 (12 Oct 2024 16:05)  HR: 73 (13 Oct 2024 11:03) (71 - 75)  BP: 132/83 (13 Oct 2024 11:03) (114/76 - 132/83)  BP(mean): --  RR: 18 (13 Oct 2024 07:08) (17 - 18)  SpO2: 97% (13 Oct 2024 11:03) (96% - 100%)  Parameters below as of 13 Oct 2024 11:03  Patient On (Oxygen Delivery Method): room air    Blurred vision, right, sees, well, left  Lungs, bilateral air entry    < from: MR Head w/ IV Cont (10.11.24 @ 13:12) >    MRI ORBIT  Motion degraded imaging of the optic nerves.  Accounting for limits no evidence of optic neuritis. No intraorbital   lesions or mass effect.  BRAIN:  No acute cerebral ischemia, intracranial hemorrhages or space-occupying   lesions.  Chronic involutional changes.    < end of copied text >    < from: Xray Chest 1 View-PORTABLE IMMEDIATE (Xray Chest 1 View-PORTABLE IMMEDIATE .) (10.11.24 @ 16:47) >    Heart magnified by technique. Elongated aorta noted.    Lungs are clear and chest is similar to January 10 this year.    IMPRESSION: No acute finding or change.    < end of copied text >  CAPILLARY BLOOD GLUCOSE    CAPILLARY BLOOD GLUCOSE  POCT Blood Glucose.: 343 mg/dL (13 Oct 2024 11:48)  POCT Blood Glucose.: 200 mg/dL (13 Oct 2024 07:53)  POCT Blood Glucose.: 240 mg/dL (12 Oct 2024 21:33)  POCT Blood Glucose.: 267 mg/dL (12 Oct 2024 16:43)                  14.0   5.04  )-----------( 216      ( 13 Oct 2024 05:25 )             40.2   10-13    132[L]  |  102  |  16  ----------------------------<  173[H]  4.1   |  23  |  0.83    Ca    8.9      13 Oct 2024 05:25  Phos  4.1     10-13  Mg     1.8     10-13    TPro  6.9  /  Alb  3.8  /  TBili  0.3  /  DBili  x   /  AST  19  /  ALT  34  /  AlkPhos  37[L]  10-13    Treponema Pallidum Antibody Interpretation: Negative: HIV-1/2 Combo Result: Nonreact: Nonreactive: HIV-1 p24 antigen and HIV-1/HIV-2 antibodies were not   detected.    IMP:  Two week history of blurred vision          No evidence of optic neuritis on ophthalmologist's exam or MR; syphilis screen is negative          no evidence of mass, bleed, or embolus          EBENEZER pneumonia, unclear underlying lung disease          ASCVD, s/p PCI          DM, needs better control          HTN  Plan: Hold EMB          Neurology consultation          Pulmonary consultation, Dr. Tidwell

## 2024-10-13 NOTE — PROGRESS NOTE ADULT - PROBLEM SELECTOR PLAN 1
p/w blurring of vision for 2 weeks, worse over last 2 days w/ Rt > Lt  on ethambutol for past 9 months for MAC infection  NIHSS 0  CTH no ICH, mass or infarct   hba1c 7.6, lipid panel abnormal  as per o/p ophthalmologist Dr. De La Vega findings inconsistent w/ optic neuritis 2/2 ethambutol   MR brain and orbit: neg for acute ischemia and optic neuritis, chronic involutional change  TSH: 1.1 wnl, HIV neg, RPR neg   TTE: EF 71%, mild (grade 1) LV iastolic dysfunction  - tele monitoring  - hold ethambutol since possible offending agent  - neuro consulted, Dr. Sun pending recs p/w blurring of vision for 2 weeks, worse over last 2 days w/ Rt > Lt  on ethambutol for past 9 months for MAC infection  NIHSS 0  CTH no ICH, mass or infarct   hba1c 7.6, lipid panel abnormal  as per o/p ophthalmologist Dr. De La Vega findings inconsistent w/ optic neuritis 2/2 ethambutol   MR brain and orbit: neg for acute ischemia and optic neuritis, chronic involutional change  TSH: 1.1 wnl, HIV neg, RPR neg   TTE: EF 71%, mild (grade 1) LV diastolic dysfunction  - tele monitoring  - hold ethambutol since possible offending agent  - neuro consulted, Dr. Sun pending recs

## 2024-10-13 NOTE — PROGRESS NOTE ADULT - ASSESSMENT
64 M, PMHx HTN, DM, CAD s/p stent x2 ([ast on Aug 24], HLD, MAC infection [taking Rifampicin, Ethambutol and Azithro]. p/w blurring of vision, started 2 days ago, acute in onset, worse over last 2-3 days. Admitted to r/o CVA vs optic neuritis. MRI brain and orbit neg for optic neuritis and acute ischemia. HIV and RPR neg. Pending neuro eval.

## 2024-10-14 VITALS
OXYGEN SATURATION: 100 % | RESPIRATION RATE: 18 BRPM | DIASTOLIC BLOOD PRESSURE: 86 MMHG | TEMPERATURE: 99 F | HEART RATE: 72 BPM | SYSTOLIC BLOOD PRESSURE: 120 MMHG

## 2024-10-14 LAB
ALBUMIN SERPL ELPH-MCNC: 3.8 G/DL — SIGNIFICANT CHANGE UP (ref 3.5–5)
ALP SERPL-CCNC: 42 U/L — SIGNIFICANT CHANGE UP (ref 40–120)
ALT FLD-CCNC: 32 U/L DA — SIGNIFICANT CHANGE UP (ref 10–60)
ANION GAP SERPL CALC-SCNC: 9 MMOL/L — SIGNIFICANT CHANGE UP (ref 5–17)
AST SERPL-CCNC: 18 U/L — SIGNIFICANT CHANGE UP (ref 10–40)
BILIRUB SERPL-MCNC: 0.3 MG/DL — SIGNIFICANT CHANGE UP (ref 0.2–1.2)
BUN SERPL-MCNC: 16 MG/DL — SIGNIFICANT CHANGE UP (ref 7–18)
CALCIUM SERPL-MCNC: 8.8 MG/DL — SIGNIFICANT CHANGE UP (ref 8.4–10.5)
CHLORIDE SERPL-SCNC: 101 MMOL/L — SIGNIFICANT CHANGE UP (ref 96–108)
CO2 SERPL-SCNC: 24 MMOL/L — SIGNIFICANT CHANGE UP (ref 22–31)
CREAT SERPL-MCNC: 0.79 MG/DL — SIGNIFICANT CHANGE UP (ref 0.5–1.3)
EGFR: 98 ML/MIN/1.73M2 — SIGNIFICANT CHANGE UP
GLUCOSE BLDC GLUCOMTR-MCNC: 183 MG/DL — HIGH (ref 70–99)
GLUCOSE BLDC GLUCOMTR-MCNC: 275 MG/DL — HIGH (ref 70–99)
GLUCOSE BLDC GLUCOMTR-MCNC: 285 MG/DL — HIGH (ref 70–99)
GLUCOSE SERPL-MCNC: 239 MG/DL — HIGH (ref 70–99)
HCT VFR BLD CALC: 39 % — SIGNIFICANT CHANGE UP (ref 39–50)
HGB BLD-MCNC: 13.8 G/DL — SIGNIFICANT CHANGE UP (ref 13–17)
MAGNESIUM SERPL-MCNC: 1.9 MG/DL — SIGNIFICANT CHANGE UP (ref 1.6–2.6)
MCHC RBC-ENTMCNC: 30.7 PG — SIGNIFICANT CHANGE UP (ref 27–34)
MCHC RBC-ENTMCNC: 35.4 GM/DL — SIGNIFICANT CHANGE UP (ref 32–36)
MCV RBC AUTO: 86.9 FL — SIGNIFICANT CHANGE UP (ref 80–100)
NRBC # BLD: 0 /100 WBCS — SIGNIFICANT CHANGE UP (ref 0–0)
PHOSPHATE SERPL-MCNC: 3.6 MG/DL — SIGNIFICANT CHANGE UP (ref 2.5–4.5)
PLATELET # BLD AUTO: 200 K/UL — SIGNIFICANT CHANGE UP (ref 150–400)
POTASSIUM SERPL-MCNC: 4.1 MMOL/L — SIGNIFICANT CHANGE UP (ref 3.5–5.3)
POTASSIUM SERPL-SCNC: 4.1 MMOL/L — SIGNIFICANT CHANGE UP (ref 3.5–5.3)
PROT SERPL-MCNC: 6.9 G/DL — SIGNIFICANT CHANGE UP (ref 6–8.3)
RBC # BLD: 4.49 M/UL — SIGNIFICANT CHANGE UP (ref 4.2–5.8)
RBC # FLD: 12.6 % — SIGNIFICANT CHANGE UP (ref 10.3–14.5)
SODIUM SERPL-SCNC: 134 MMOL/L — LOW (ref 135–145)
WBC # BLD: 5.87 K/UL — SIGNIFICANT CHANGE UP (ref 3.8–10.5)
WBC # FLD AUTO: 5.87 K/UL — SIGNIFICANT CHANGE UP (ref 3.8–10.5)

## 2024-10-14 PROCEDURE — 99239 HOSP IP/OBS DSCHRG MGMT >30: CPT | Mod: GC

## 2024-10-14 RX ORDER — AMLODIPINE BESYLATE 5 MG
1 TABLET ORAL
Qty: 0 | Refills: 0 | DISCHARGE
Start: 2024-10-14

## 2024-10-14 RX ORDER — LINACLOTIDE 72 UG/1
1 CAPSULE, GELATIN COATED ORAL
Refills: 0 | DISCHARGE

## 2024-10-14 RX ORDER — AZITHROMYCIN 250 MG/1
1 TABLET, FILM COATED ORAL
Refills: 0 | DISCHARGE

## 2024-10-14 RX ORDER — DAPAGLIFLOZIN 10 MG/1
1 TABLET, FILM COATED ORAL
Refills: 0 | DISCHARGE

## 2024-10-14 RX ORDER — LOSARTAN POTASSIUM 100 MG/1
1 TABLET, FILM COATED ORAL
Qty: 0 | Refills: 0 | DISCHARGE
Start: 2024-10-14

## 2024-10-14 RX ORDER — METOPROLOL TARTRATE 50 MG
1 TABLET ORAL
Refills: 0 | DISCHARGE

## 2024-10-14 RX ORDER — ETHAMBUTOL HYDROCHLORIDE 400 MG/1
1 TABLET, FILM COATED ORAL
Refills: 0 | DISCHARGE

## 2024-10-14 RX ORDER — RIFAMPIN 300 MG
2 CAPSULE ORAL
Refills: 0 | DISCHARGE

## 2024-10-14 RX ORDER — EZETIMIBE 10 MG/1
1 TABLET ORAL
Refills: 0 | DISCHARGE

## 2024-10-14 RX ORDER — GLIPIZIDE 5 MG/1
1 TABLET ORAL
Refills: 0 | DISCHARGE

## 2024-10-14 RX ORDER — ATORVASTATIN CALCIUM 10 MG/1
1 TABLET, FILM COATED ORAL
Refills: 0 | DISCHARGE

## 2024-10-14 RX ORDER — LINAGLIPTIN AND METFORMIN HYDROCHLORIDE 2.5; 85 MG/1; MG/1
1 TABLET, FILM COATED ORAL
Refills: 0 | DISCHARGE

## 2024-10-14 RX ADMIN — Medication 5 MILLIGRAM(S): at 05:25

## 2024-10-14 RX ADMIN — Medication 75 MILLIGRAM(S): at 12:12

## 2024-10-14 RX ADMIN — Medication 3: at 12:13

## 2024-10-14 RX ADMIN — EZETIMIBE 10 MILLIGRAM(S): 10 TABLET ORAL at 12:12

## 2024-10-14 RX ADMIN — LOSARTAN POTASSIUM 100 MILLIGRAM(S): 100 TABLET, FILM COATED ORAL at 05:25

## 2024-10-14 RX ADMIN — Medication 3: at 17:01

## 2024-10-14 RX ADMIN — CETIRIZINE HYDROCHLORIDE 5 MILLIGRAM(S): 10 TABLET ORAL at 16:41

## 2024-10-14 RX ADMIN — Medication 81 MILLIGRAM(S): at 12:11

## 2024-10-14 RX ADMIN — Medication 50 MILLIGRAM(S): at 05:25

## 2024-10-14 RX ADMIN — Medication 1: at 08:18

## 2024-10-14 RX ADMIN — AZITHROMYCIN 500 MILLIGRAM(S): 250 TABLET, FILM COATED ORAL at 12:12

## 2024-10-14 RX ADMIN — Medication 50 MILLIGRAM(S): at 17:00

## 2024-10-14 NOTE — DISCHARGE NOTE PROVIDER - CARE PROVIDERS DIRECT ADDRESSES
[email or web address]

## 2024-10-14 NOTE — DISCHARGE NOTE NURSING/CASE MANAGEMENT/SOCIAL WORK - PATIENT PORTAL LINK FT
You can access the FollowMyHealth Patient Portal offered by [facility] by registering at the following website: [email or web address]. By joining [facility]’s FollowMyHealth portal, you will also be able to view your health information using other applications (apps) compatible with our system.

## 2024-10-14 NOTE — DISCHARGE NOTE PROVIDER - NSDCCPCAREPLAN_GEN_ALL_CORE_FT
PRINCIPAL DISCHARGE DIAGNOSIS  Diagnosis: Blurring of vision  Assessment and Plan of Treatment: You presented with bluring of vision in your right eye. You were admitted to rule out stroke and optic neuritis. Imaging of the head and orbits were normal and did not indicate a medical emergency. We reached out to your opthalmologist, Dr. Yolette Addison to obtain more information. You were monitored very closely and no abnormal heart __________      SECONDARY DISCHARGE DIAGNOSES  Diagnosis: Pulmonary Mycobacterium avium infection  Assessment and Plan of Treatment: Mycobacterium avium complex (MAC) is a type of bacterial infection that affects the lungs. It is a slow-growing bacterium commonly found in the environment, including water and soil. While it can be present in healthy people without causing illness, it can lead to lung problems in some individuals, especially those with weakened immune systems or pre-existing lung conditions. Continue taking your prescribed antibiotics, RIFAMPIN, ETHAMBUTOL, and AZITHROMYCIN. These medications are crucial in managing the infection and may be taken for an extended period, often several months. It is important to take them exactly as directed and not to miss doses. Please follow up with your pulmonologist within a week from discharge for furhter management.    Diagnosis: HTN (hypertension)  Assessment and Plan of Treatment: You have a history of Hypertension. On this admission, your Blood Pressure was adequately controlled with amlodipine, metoprolol and losartan. Check your blood pressure regularly at home, your blood pressure target is 120-140/80-90. If your BP is elevated over 180/110, please seek urgent medical attention. To care for your blood pressure at home maintain a healthy lifestyle, eat a low salt diet and added sugars, avoid fatty food, try to lose weight, and exercise regularly or stay active as tolerated 30 mins X 3 times per week. Notify your doctor if you have any of the following symptoms: (dizziness, lightheadedness, blurry vision, headache, chest pain, shortness of breath.) Please continue taking your home medications as prescribed and follow-up with your PCP in 1 week from discharge to adjust medications as needed.    Diagnosis: HLD (hyperlipidemia)  Assessment and Plan of Treatment: You have history of Hyperlipidemia. On this admission you were found to have abnormal lipid profile. Please take ATORVASTATIN 80 MG DAILY AT BEDTIME. Maintain healthy lifestyle, low fat diet, exercise regularly and check your lipid levels routinely. Please follow up with your PCP in 1 week from discharge.    Diagnosis: DM (diabetes mellitus)  Assessment and Plan of Treatment: You have history of diabetes. Your HbA1c was 7.4 during this admission. You need to continue monitoring your blood sugar levels closely. Please continue to take Farxiga, linagliptin, and metformin as prescribed by your doctor. Eat a diet that is low in added starches and sugars. Diabetes is associated with increased risk of many health conditions, including heart attacks, stroke, infections, kidney failure, and blindness. If you are physically able to, exercise at least 3 times a week. Please follow up with your primary care doctor/Endocrinologist within a week of discharge.      Diagnosis: CAD S/P percutaneous coronary angioplasty  Assessment and Plan of Treatment: You have history of coronary artery disease which is a narrowing of the arteries of your heart caused by a buildup of plaque made of cholesterol. The narrowing decreases the amount of blood flow to your heart. Please continue to take your meds as prescribed, eat a healthy diet low in bad fats and added sugars, and exercise at least 3-5x/week if you are physically able. Follow up outpatient with your primary care doctor and cardiologist. You are taking aspirin and plavix as home medications.  Please continue to take your medications as prescribed. Please follow with your PCP/Cardiologist in a week.     PRINCIPAL DISCHARGE DIAGNOSIS  Diagnosis: Blurring of vision  Assessment and Plan of Treatment: You presented with bluring of vision in your right eye. You were admitted to rule out stroke and optic neuritis. Imaging of the head and orbits were normal and did not indicate a medical emergency. We reached out to your opthalmologist, Dr. Yolette Addison to obtain more information. You were monitored very closely and no abnormal heart rhythms. Echo was performed and showed normal heart function. Blood test were normal also. Please follow up with Dr. ________ on ______ located at __________.      SECONDARY DISCHARGE DIAGNOSES  Diagnosis: Pulmonary Mycobacterium avium infection  Assessment and Plan of Treatment: Mycobacterium avium complex (MAC) is a type of bacterial infection that affects the lungs. It is a slow-growing bacterium commonly found in the environment, including water and soil. While it can be present in healthy people without causing illness, it can lead to lung problems in some individuals, especially those with weakened immune systems or pre-existing lung conditions. Continue taking your prescribed antibiotics, RIFAMPIN, ETHAMBUTOL, and AZITHROMYCIN. These medications are crucial in managing the infection and may be taken for an extended period, often several months. It is important to take them exactly as directed and not to miss doses. Please follow up with your pulmonologist within a week from discharge for furhter management.    Diagnosis: HTN (hypertension)  Assessment and Plan of Treatment: You have a history of Hypertension. On this admission, your Blood Pressure was adequately controlled with amlodipine, metoprolol and losartan. Check your blood pressure regularly at home, your blood pressure target is 120-140/80-90. If your BP is elevated over 180/110, please seek urgent medical attention. To care for your blood pressure at home maintain a healthy lifestyle, eat a low salt diet and added sugars, avoid fatty food, try to lose weight, and exercise regularly or stay active as tolerated 30 mins X 3 times per week. Notify your doctor if you have any of the following symptoms: (dizziness, lightheadedness, blurry vision, headache, chest pain, shortness of breath.) Please continue taking your home medications as prescribed and follow-up with your PCP in 1 week from discharge to adjust medications as needed.    Diagnosis: HLD (hyperlipidemia)  Assessment and Plan of Treatment: You have history of Hyperlipidemia. On this admission you were found to have abnormal lipid profile. Please take ATORVASTATIN 80 MG DAILY AT BEDTIME. Maintain healthy lifestyle, low fat diet, exercise regularly and check your lipid levels routinely. Please follow up with your PCP in 1 week from discharge.    Diagnosis: DM (diabetes mellitus)  Assessment and Plan of Treatment: You have history of diabetes. Your HbA1c was 7.4 during this admission. You need to continue monitoring your blood sugar levels closely. Please continue to take Farxiga, linagliptin, and metformin as prescribed by your doctor. Eat a diet that is low in added starches and sugars. Diabetes is associated with increased risk of many health conditions, including heart attacks, stroke, infections, kidney failure, and blindness. If you are physically able to, exercise at least 3 times a week. Please follow up with your primary care doctor/Endocrinologist within a week of discharge.      Diagnosis: CAD S/P percutaneous coronary angioplasty  Assessment and Plan of Treatment: You have history of coronary artery disease which is a narrowing of the arteries of your heart caused by a buildup of plaque made of cholesterol. The narrowing decreases the amount of blood flow to your heart. Please continue to take your meds as prescribed, eat a healthy diet low in bad fats and added sugars, and exercise at least 3-5x/week if you are physically able. Follow up outpatient with your primary care doctor and cardiologist. You are taking aspirin and plavix as home medications.  Please continue to take your medications as prescribed. Please follow with your PCP/Cardiologist in a week.     PRINCIPAL DISCHARGE DIAGNOSIS  Diagnosis: Blurring of vision  Assessment and Plan of Treatment: You presented with bluring of vision in your right eye. You were admitted to rule out stroke and optic neuritis. Imaging of the head and orbits were normal and did not indicate a medical emergency. We reached out to your opthalmologist, Dr. Yolette Addison to obtain more information. You were monitored very closely and no abnormal heart rhythms. Echo was performed and showed normal heart function. Blood test were normal also. Please follow up with Dr. ________ on ______ located at __________.      SECONDARY DISCHARGE DIAGNOSES  Diagnosis: Pulmonary Mycobacterium avium infection  Assessment and Plan of Treatment: Mycobacterium avium complex (MAC) is a type of bacterial infection that affects the lungs. It is a slow-growing bacterium commonly found in the environment, including water and soil. While it can be present in healthy people without causing illness, it can lead to lung problems in some individuals, especially those with weakened immune systems or pre-existing lung conditions. Continue taking your prescribed antibiotics, RIFAMPIN, ETHAMBUTOL, and AZITHROMYCIN. These medications are crucial in managing the infection and may be taken for an extended period, often several months. It is important to take them exactly as directed and not to miss doses. Please follow up with your pulmonologist within a week from discharge for furhter management.    Diagnosis: HTN (hypertension)  Assessment and Plan of Treatment: You have a history of Hypertension. On this admission, your Blood Pressure was adequately controlled with amlodipine, metoprolol and losartan. Check your blood pressure regularly at home, your blood pressure target is 120-140/80-90. If your BP is elevated over 180/110, please seek urgent medical attention. To care for your blood pressure at home maintain a healthy lifestyle, eat a low salt diet and added sugars, avoid fatty food, try to lose weight, and exercise regularly or stay active as tolerated 30 mins X 3 times per week. Notify your doctor if you have any of the following symptoms: (dizziness, lightheadedness, blurry vision, headache, chest pain, shortness of breath.) Please continue taking your home medications as prescribed and follow-up with your PCP in 1 week from discharge to adjust medications as needed.    Diagnosis: HLD (hyperlipidemia)  Assessment and Plan of Treatment: You have history of Hyperlipidemia. On this admission you were found to have abnormal lipid profile. Please contineu to take ZETIA 10 MG DAILY. Maintain healthy lifestyle, low fat diet, exercise regularly and check your lipid levels routinely. Please follow up with your PCP in 1 week from discharge.    Diagnosis: DM (diabetes mellitus)  Assessment and Plan of Treatment: You have history of diabetes. Your HbA1c was 7.4 during this admission. You need to continue monitoring your blood sugar levels closely. Please continue to take Farxiga, linagliptin, and metformin as prescribed by your doctor. Eat a diet that is low in added starches and sugars. Diabetes is associated with increased risk of many health conditions, including heart attacks, stroke, infections, kidney failure, and blindness. If you are physically able to, exercise at least 3 times a week. Please follow up with your primary care doctor/Endocrinologist within a week of discharge.      Diagnosis: CAD S/P percutaneous coronary angioplasty  Assessment and Plan of Treatment: You have history of coronary artery disease which is a narrowing of the arteries of your heart caused by a buildup of plaque made of cholesterol. The narrowing decreases the amount of blood flow to your heart. Please continue to take your meds as prescribed, eat a healthy diet low in bad fats and added sugars, and exercise at least 3-5x/week if you are physically able. Follow up outpatient with your primary care doctor and cardiologist. You are taking aspirin and plavix as home medications.  Please continue to take your medications as prescribed. Please follow with your PCP/Cardiologist in a week.     PRINCIPAL DISCHARGE DIAGNOSIS  Diagnosis: Blurring of vision  Assessment and Plan of Treatment: You presented with bluring of vision in your right eye. You were admitted to rule out stroke and optic neuritis. Imaging of the head and orbits were normal and did not indicate a medical emergency. We reached out to your opthalmologist, Dr. Yolette Addison to obtain more information. You were monitored very closely and no abnormal heart rhythms. Echo was performed and showed normal heart function. Blood test were normal also. Please follow up with opthalmologist within 1-3 days.      SECONDARY DISCHARGE DIAGNOSES  Diagnosis: Pulmonary Mycobacterium avium infection  Assessment and Plan of Treatment: Mycobacterium avium complex (MAC) is a type of bacterial infection that affects the lungs. It is a slow-growing bacterium commonly found in the environment, including water and soil. While it can be present in healthy people without causing illness, it can lead to lung problems in some individuals, especially those with weakened immune systems or pre-existing lung conditions. Continue taking your prescribed antibiotics, RIFAMPIN, ETHAMBUTOL, and AZITHROMYCIN. These medications are crucial in managing the infection and may be taken for an extended period, often several months. It is important to take them exactly as directed and not to miss doses. Please follow up with your pulmonologist within a week from discharge for furhter management.    Diagnosis: HTN (hypertension)  Assessment and Plan of Treatment: You have a history of Hypertension. On this admission, your Blood Pressure was adequately controlled with amlodipine, metoprolol and losartan. Check your blood pressure regularly at home, your blood pressure target is 120-140/80-90. If your BP is elevated over 180/110, please seek urgent medical attention. To care for your blood pressure at home maintain a healthy lifestyle, eat a low salt diet and added sugars, avoid fatty food, try to lose weight, and exercise regularly or stay active as tolerated 30 mins X 3 times per week. Notify your doctor if you have any of the following symptoms: (dizziness, lightheadedness, blurry vision, headache, chest pain, shortness of breath.) Please continue taking your home medications as prescribed and follow-up with your PCP in 1 week from discharge to adjust medications as needed.    Diagnosis: HLD (hyperlipidemia)  Assessment and Plan of Treatment: You have history of Hyperlipidemia. On this admission you were found to have abnormal lipid profile. Please contineu to take ZETIA 10 MG DAILY. Maintain healthy lifestyle, low fat diet, exercise regularly and check your lipid levels routinely. Please follow up with your PCP in 1 week from discharge.    Diagnosis: DM (diabetes mellitus)  Assessment and Plan of Treatment: You have history of diabetes. Your HbA1c was 7.4 during this admission. You need to continue monitoring your blood sugar levels closely. Please continue to take Farxiga, linagliptin, and metformin as prescribed by your doctor. Eat a diet that is low in added starches and sugars. Diabetes is associated with increased risk of many health conditions, including heart attacks, stroke, infections, kidney failure, and blindness. If you are physically able to, exercise at least 3 times a week. Please follow up with your primary care doctor/Endocrinologist within a week of discharge.      Diagnosis: CAD S/P percutaneous coronary angioplasty  Assessment and Plan of Treatment: You have history of coronary artery disease which is a narrowing of the arteries of your heart caused by a buildup of plaque made of cholesterol. The narrowing decreases the amount of blood flow to your heart. Please continue to take your meds as prescribed, eat a healthy diet low in bad fats and added sugars, and exercise at least 3-5x/week if you are physically able. Follow up outpatient with your primary care doctor and cardiologist. You are taking aspirin and plavix as home medications.  Please continue to take your medications as prescribed. Please follow with your PCP/Cardiologist in a week.     PRINCIPAL DISCHARGE DIAGNOSIS  Diagnosis: Blurring of vision  Assessment and Plan of Treatment: You presented with bluring of vision in your right eye. You were admitted to rule out stroke and optic neuritis. Imaging of the head and orbits were normal and did not indicate a medical emergency. We reached out to your opthalmologist, Dr. Yolette Addison to obtain more information. You were monitored very closely and no abnormal heart rhythms. Echo was performed and showed normal heart function. Blood test were normal also. Please follow up with Dr. Alex Read on 10/16/2024 at 1:45 PM located at [address], [place] [address].      SECONDARY DISCHARGE DIAGNOSES  Diagnosis: Pulmonary Mycobacterium avium infection  Assessment and Plan of Treatment: Mycobacterium avium complex (MAC) is a type of bacterial infection that affects the lungs. It is a slow-growing bacterium commonly found in the environment, including water and soil. While it can be present in healthy people without causing illness, it can lead to lung problems in some individuals, especially those with weakened immune systems or pre-existing lung conditions. Continue taking your prescribed antibiotics, RIFAMPIN, ETHAMBUTOL, and AZITHROMYCIN. These medications are crucial in managing the infection and may be taken for an extended period, often several months. It is important to take them exactly as directed and not to miss doses. Please follow up with your pulmonologist within a week from discharge for furhter management.    Diagnosis: HTN (hypertension)  Assessment and Plan of Treatment: You have a history of Hypertension. On this admission, your Blood Pressure was adequately controlled with amlodipine, metoprolol and losartan. Check your blood pressure regularly at home, your blood pressure target is 120-140/80-90. If your BP is elevated over 180/110, please seek urgent medical attention. To care for your blood pressure at home maintain a healthy lifestyle, eat a low salt diet and added sugars, avoid fatty food, try to lose weight, and exercise regularly or stay active as tolerated 30 mins X 3 times per week. Notify your doctor if you have any of the following symptoms: (dizziness, lightheadedness, blurry vision, headache, chest pain, shortness of breath.) Please continue taking your home medications as prescribed and follow-up with your PCP in 1 week from discharge to adjust medications as needed.    Diagnosis: HLD (hyperlipidemia)  Assessment and Plan of Treatment: You have history of Hyperlipidemia. On this admission you were found to have abnormal lipid profile. Please contineu to take ZETIA 10 MG DAILY. Maintain healthy lifestyle, low fat diet, exercise regularly and check your lipid levels routinely. Please follow up with your PCP in 1 week from discharge.    Diagnosis: DM (diabetes mellitus)  Assessment and Plan of Treatment: You have history of diabetes. Your HbA1c was 7.4 during this admission. You need to continue monitoring your blood sugar levels closely. Please continue to take Farxiga, linagliptin, and metformin as prescribed by your doctor. Eat a diet that is low in added starches and sugars. Diabetes is associated with increased risk of many health conditions, including heart attacks, stroke, infections, kidney failure, and blindness. If you are physically able to, exercise at least 3 times a week. Please follow up with your primary care doctor/Endocrinologist within a week of discharge.      Diagnosis: CAD S/P percutaneous coronary angioplasty  Assessment and Plan of Treatment: You have history of coronary artery disease which is a narrowing of the arteries of your heart caused by a buildup of plaque made of cholesterol. The narrowing decreases the amount of blood flow to your heart. Please continue to take your meds as prescribed, eat a healthy diet low in bad fats and added sugars, and exercise at least 3-5x/week if you are physically able. Follow up outpatient with your primary care doctor and cardiologist. You are taking aspirin and plavix as home medications.  Please continue to take your medications as prescribed. Please follow with your PCP/Cardiologist in a week.     PRINCIPAL DISCHARGE DIAGNOSIS  Diagnosis: Blurring of vision  Assessment and Plan of Treatment: You presented with bluring of vision in your right eye. You were admitted to rule out stroke and optic neuritis. Imaging of the head and orbits were normal, did not show any acute stroke or optic neuritis. We reached out to your opthalmologist, Dr. Yolette Addison to obtain more information. You were monitored very closely and no abnormal heart rhythms. Echo was performed and showed normal heart function. Blood test were normal also. Please follow up with opthamologist Dr. Alex Read on 10/16/2024 at 1:45 PM located at [address] [place] [address] [address], [place] [address], for further managemet of blurring of your vision.      SECONDARY DISCHARGE DIAGNOSES  Diagnosis: Pulmonary Mycobacterium avium infection  Assessment and Plan of Treatment: Mycobacterium avium complex (MAC) is a type of bacterial infection that affects the lungs. It is a slow-growing bacterium commonly found in the environment, including water and soil. While it can be present in healthy people without causing illness, it can lead to lung problems in some individuals, especially those with weakened immune systems or pre-existing lung conditions. Continue taking your prescribed antibiotics, RIFAMPIN, and AZITHROMYCIN. These medications are crucial in managing the infection and may be taken for an extended period, often several months. It is important to take them exactly as directed and not to miss doses. Please follow up with your pulmonologist within a week from discharge for furhter management. Please hold ETHAMBUTOL on discharge as this medication can be a causative agent and lead to blurring or loss of vision. It is safe to restart this medication once your outpatient opthamologist has reviewed the cause of blurriness of vision and has deemed it safe to restart the medication.    Diagnosis: HTN (hypertension)  Assessment and Plan of Treatment: You have a history of Hypertension. On this admission, your blood pessure was adequately controlled with amlodipine, metoprolol and losartan. Check your blood pressure regularly at home, your blood pressure target is 120-140/80-90. If your BP is elevated over 180/110, please seek urgent medical attention. To care for your blood pressure at home maintain a healthy lifestyle, eat a low salt diet and added sugars, avoid fatty food, try to lose weight, and exercise regularly or stay active as tolerated 30 mins X 3 times per week. Notify your doctor if you have any of the following symptoms: (dizziness, lightheadedness, blurry vision, headache, chest pain, shortness of breath.) Please continue taking your home medications as prescribed and follow-up with your PCP in 1 week from discharge to adjust medications as needed.    Diagnosis: HLD (hyperlipidemia)  Assessment and Plan of Treatment: You have history of Hyperlipidemia. On this admission you were found to have abnormal lipid profile. Please contineu to take ZETIA 10 MG DAILY. Maintain healthy lifestyle, low fat diet, exercise regularly and check your lipid levels routinely. Please follow up with your PCP in 1 week from discharge.    Diagnosis: DM (diabetes mellitus)  Assessment and Plan of Treatment: You have history of diabetes. Your HbA1c was 7.4 during this admission. You need to continue monitoring your blood sugar levels closely. Please continue to take Farxiga, linagliptin, and metformin as prescribed by your doctor. Eat a diet that is low in added starches and sugars. Diabetes is associated with increased risk of many health conditions, including heart attacks, stroke, infections, kidney failure, and blindness. If you are physically able to, exercise at least 3 times a week. Please follow up with your primary care doctor/Endocrinologist within a week of discharge.      Diagnosis: CAD S/P percutaneous coronary angioplasty  Assessment and Plan of Treatment: You have history of coronary artery disease which is a narrowing of the arteries of your heart caused by a buildup of plaque made of cholesterol. The narrowing decreases the amount of blood flow to your heart. Please continue to take your meds as prescribed, eat a healthy diet low in bad fats and added sugars, and exercise at least 3-5x/week if you are physically able. Follow up outpatient with your primary care doctor and cardiologist. You are taking aspirin and plavix as home medications.  Please continue to take your medications as prescribed. Please follow with your PCP/Cardiologist in a week.

## 2024-10-14 NOTE — DISCHARGE NOTE PROVIDER - ATTENDING DISCHARGE PHYSICAL EXAMINATION:
Alert, cooperative man in NAD  Vital Signs Last 24 Hrs  T(C): 37 (14 Oct 2024 11:43), Max: 37 (14 Oct 2024 11:43)  T(F): 98.6 (14 Oct 2024 11:43), Max: 98.6 (14 Oct 2024 11:43)  HR: 72 (14 Oct 2024 11:43) (66 - 78)  BP: 120/86 (14 Oct 2024 11:43) (115/80 - 140/90)  BP(mean): 96 (13 Oct 2024 20:43) (96 - 96)  RR: 18 (14 Oct 2024 11:43) (17 - 18)  SpO2: 100% (14 Oct 2024 11:43) (95% - 100%)    Parameters below as of 14 Oct 2024 11:43  Patient On (Oxygen Delivery Method): room air  Blurred vision, right  Lungs, clear  Cor, RRR  Abdomen, soft  Neurological, blurred vision, otherwise, non-focal

## 2024-10-14 NOTE — CONSULT NOTE ADULT - TIME BILLING
I have discussed this patient's case with Vangie Franco NP, and I agree with the history, examination, assessment, and plan, and I have amended the documentation as necessary.

## 2024-10-14 NOTE — DISCHARGE NOTE PROVIDER - NSDCMRMEDTOKEN_GEN_ALL_CORE_FT
amlodipine-olmesartan 5 mg-40 mg oral tablet: 1 tab(s) orally once a day  aspirin 81 mg oral delayed release tablet: 1 tab(s) orally once a day  azelastine 0.05% ophthalmic solution: 1 drop(s) in each affected eye once a day  azithromycin 500 mg oral tablet: 1 tab(s) orally once a day  ethambutol 400 mg oral tablet: 1 tab(s) orally once a day  ezetimibe 10 mg oral tablet: 1 tab(s) orally once a day  Farxiga 10 mg oral tablet: 1 tab(s) orally once a day  glipiZIDE 10 mg oral tablet: 1 tab(s) orally once a day  Jentadueto 2.5 mg-1000 mg oral tablet: 1 tab(s) orally once a day  Linzess 145 mcg oral capsule: 1 cap(s) orally once a day  metoprolol tartrate 50 mg oral tablet: 1 tab(s) orally 2 times a day  Plavix 75 mg oral tablet: 1 tab(s) orally once a day  rifAMPin 300 mg oral capsule: 2 cap(s) orally once a day   amLODIPine 5 mg oral tablet: 1 tab(s) orally once a day  amlodipine-olmesartan 5 mg-40 mg oral tablet: 1 tab(s) orally once a day  aspirin 81 mg oral delayed release tablet: 1 tab(s) orally once a day  azelastine 0.05% ophthalmic solution: 1 drop(s) in each affected eye once a day  azithromycin 500 mg oral tablet: 1 tab(s) orally once a day  ethambutol 400 mg oral tablet: 1 tab(s) orally once a day  ezetimibe 10 mg oral tablet: 1 tab(s) orally once a day  Farxiga 10 mg oral tablet: 1 tab(s) orally once a day  glipiZIDE 10 mg oral tablet: 1 tab(s) orally once a day  Jentadueto 2.5 mg-1000 mg oral tablet: 1 tab(s) orally once a day  Linzess 145 mcg oral capsule: 1 cap(s) orally once a day  losartan 100 mg oral tablet: 1 tab(s) orally once a day  metoprolol tartrate 50 mg oral tablet: 1 tab(s) orally 2 times a day  Plavix 75 mg oral tablet: 1 tab(s) orally once a day  rifAMPin 300 mg oral capsule: 2 cap(s) orally once a day   amLODIPine 5 mg oral tablet: 1 tab(s) orally once a day  amlodipine-olmesartan 5 mg-40 mg oral tablet: 1 tab(s) orally once a day  aspirin 81 mg oral delayed release tablet: 1 tab(s) orally once a day  azelastine 0.05% ophthalmic solution: 1 drop(s) in each affected eye once a day  azithromycin 500 mg oral tablet: 1 tab(s) orally once a day  ethambutol 400 mg oral tablet: 1 tab(s) orally once a day  ezetimibe 10 mg oral tablet: 1 tab(s) orally once a day  Farxiga 10 mg oral tablet: 1 tab(s) orally once a day  glipiZIDE 10 mg oral tablet: 1 tab(s) orally once a day  Jentadueto 2.5 mg-1000 mg oral tablet: 1 tab(s) orally once a day  Linzess 145 mcg oral capsule: 1 cap(s) orally once a day  losartan 100 mg oral tablet: 1 tab(s) orally once a day  metoprolol tartrate 50 mg oral tablet: 1 tab(s) orally 2 times a day  Ophthalmology Referral: Follow up with Dr. Alex Read on 10/16/24 at 1:45 PM located at [address] [place] [id number] [phone]. Please take this referral and your insurance card and ID.  Ophthalmology Referral: Follow up with Dr. Alex Read on 10/16/24 at 1:45 PM located at [address] [place] [address] [phone]. Please take this referral and your insurance card and ID.  Plavix 75 mg oral tablet: 1 tab(s) orally once a day  rifAMPin 300 mg oral capsule: 2 cap(s) orally once a day   amLODIPine 5 mg oral tablet: 1 tab(s) orally once a day  amlodipine-olmesartan 5 mg-40 mg oral tablet: 1 tab(s) orally once a day  aspirin 81 mg oral delayed release tablet: 1 tab(s) orally once a day  azelastine 0.05% ophthalmic solution: 1 drop(s) in each affected eye once a day  azithromycin 500 mg oral tablet: 1 tab(s) orally once a day  ezetimibe 10 mg oral tablet: 1 tab(s) orally once a day  Farxiga 10 mg oral tablet: 1 tab(s) orally once a day  glipiZIDE 10 mg oral tablet: 1 tab(s) orally once a day  Jentadueto 2.5 mg-1000 mg oral tablet: 1 tab(s) orally once a day  Linzess 145 mcg oral capsule: 1 cap(s) orally once a day  losartan 100 mg oral tablet: 1 tab(s) orally once a day  metoprolol tartrate 50 mg oral tablet: 1 tab(s) orally 2 times a day  Ophthalmology Referral: Follow up with Dr. Alex Read on 10/16/24 at 1:45 PM located at [address] [place] [address], [place] [phone]. Please take this referral and your insurance card and ID.  Plavix 75 mg oral tablet: 1 tab(s) orally once a day  rifAMPin 300 mg oral capsule: 2 cap(s) orally once a day   amlodipine-olmesartan 5 mg-40 mg oral tablet: 1 tab(s) orally once a day  aspirin 81 mg oral delayed release tablet: 1 tab(s) orally once a day  azelastine 0.05% ophthalmic solution: 1 drop(s) in each affected eye once a day  azithromycin 500 mg oral tablet: 1 tab(s) orally once a day  ezetimibe 10 mg oral tablet: 1 tab(s) orally once a day  Farxiga 10 mg oral tablet: 1 tab(s) orally once a day  glipiZIDE 10 mg oral tablet: 1 tab(s) orally once a day  Jentadueto 2.5 mg-1000 mg oral tablet: 1 tab(s) orally once a day  Linzess 145 mcg oral capsule: 1 cap(s) orally once a day  metoprolol tartrate 50 mg oral tablet: 1 tab(s) orally 2 times a day  Ophthalmology Referral: Follow up with Dr. Alex Read on 10/16/24 at 1:45 PM located at [address] [place] [address], [place] [id number] [phone]. Please take this referral and your insurance card and ID.  Plavix 75 mg oral tablet: 1 tab(s) orally once a day  rifAMPin 300 mg oral capsule: 2 cap(s) orally once a day

## 2024-10-14 NOTE — CONSULT NOTE ADULT - NS ATTEND AMEND GEN_ALL_CORE FT
Impression: This is a 63 Y/O Male Presented to ED with Acute onset of blurring of vision, started 2 weeks ago and worsened over last 2-3 days. States he cannot see well in dim lights. States he had an episode of fever 2 days ago and had one episode of vomiting. For pulmonary evaluation due to Ethambutol was held due to Optic Neuritis and has  MAC infection (taking Rifampicin, Ethambutol and Azithro that he started 9 Months ago ). Admitted to r/o CVA vs optic neuritis. MRI brain and orbit negative for optic neuritis and acute ischemia. HIV and RPR negative.    Suggestion:  O2 saturation 98% room air. So far saturating good room air and no reported episode of Hypoxia .  On azithromycin 500 mg Oral daily.  On Rifampin 600 mg Oral daily. Monthly LFT.   Pulmonary follow up outpatient .   DVT / GI prophylactic.  Out pat ophth f/u

## 2024-10-14 NOTE — CONSULT NOTE ADULT - ASSESSMENT
Assessment and Plan:   64y/M, from home, with PMH of HTN, DM, CAD s/p stent X2 (last on Aug 24), HLD, MAC infection (taking Rifampicin, Ethambutol and Azithro). presents to ED with blurring of vision, started 2 weeks ago, acute in onset, worse over last 2-3 days. Admitted to r/o CVA vs optic neuritis.    Impression : Blurry vision with non focal neuro exam and  negative imaging including CT and MRI Brain and orbits, could be likely multifocal - Drug related (Ethambutol)  or DM related(A1C of 7.4) or eye related.   Reccs:     - CTH negative for acute intracranial pathology  - CTA H/N: No LVO/HGS  - MRI Brain W/WO: No acute ischemia, ICH/SOL.  - MR Orbits: No evidence of optic Neuritis or Intraorbital lesion or mass effect.  - Ethambutol per primary team and pulm  - Outpt Ophthal follow up  - Vascular risk factor control - better control of DM and HTN.  - Further care per primary team.        Discussed with Neuro attending Dr. Read.

## 2024-10-14 NOTE — DISCHARGE NOTE NURSING/CASE MANAGEMENT/SOCIAL WORK - NSDCFUADDAPPT_GEN_ALL_CORE_FT
APPTS ARE READY TO BE MADE: [X] YES    Best Family or Patient Contact (if needed):    Additional Information about above appointments (if needed):    1: Opthalmology Dr. Yoana Read   2: PCP Dr. Randal Barr  3. Pulmonology Dr. Marvin Hammer

## 2024-10-14 NOTE — DISCHARGE NOTE PROVIDER - PROVIDER TOKENS
PROVIDER:[[id number]],FOLLOWUP:[1 week],ESTABLISHEDPATIENT:[T]] PROVIDER:[[id number]],FOLLOWUP:[1 week],ESTABLISHEDPATIENT:[T]],PROVIDER:[[id number]],FOLLOWUP:[1-3 days],ESTABLISHEDPATIENT:[T]] PROVIDER:[[id number]],SCHEDULEDAPPT:[10/16/2024],SCHEDULEDAPPTTIME:[01:45 PM]],PROVIDER:[[id number]],FOLLOWUP:[1 week],ESTABLISHEDPATIENT:[T]],PROVIDER:[[id number]],FOLLOWUP:[1 week]]

## 2024-10-14 NOTE — CONSULT NOTE ADULT - CONSULT REASON
Blurry vision
Goal Outcome Evaluation:  Plan of Care Reviewed With: patient, family           Outcome Evaluation: PT initial eval completed. Pt limited by R sided weakness, balanace deficits, and decreased functional endurance compared to baseline. pt demos bed mobility and MMT decreased from PLoF that negatively affects ability to perform transfers safely. per chart pt is w/c bound at baseline. Rec skilled IPPT to increase indep with mobility and transfers. d/c rec for IRF. subject to change based on course fo stay.  
Pulmonary Mycobacterium Sridhar Infection.   Ethambutol held due to Optic Neuritis.

## 2024-10-14 NOTE — DISCHARGE NOTE PROVIDER - NSDCFUADDAPPT_GEN_ALL_CORE_FT
APPTS ARE READY TO BE MADE: [X] YES    Best Family or Patient Contact (if needed):    Additional Information about above appointments (if needed):    1: PCP Dr. Randal Barr  2: Opthalmology      APPTS ARE READY TO BE MADE: [X] YES    Best Family or Patient Contact (if needed):    Additional Information about above appointments (if needed):    1: PCP Dr. Randal Barr  2: Opthalmology Dr. Yolette Villalobos  3. Pulmonology       APPTS ARE READY TO BE MADE: [X] YES    Best Family or Patient Contact (if needed):    Additional Information about above appointments (if needed):    1: Opthalmology Dr. Yoana Read   2: PCP Dr. Randal Barr  3. Pulmonology Dr. Marvin Hammer     APPTS ARE READY TO BE MADE: [X] YES    Best Family or Patient Contact (if needed):    Additional Information about above appointments (if needed):    1: Opthalmology Dr. Yoana Read   2: PCP Dr. Randal Barr  3. Pulmonology Dr. Marvin Hammer        Patient was outreached on 10/15, 10/17 and 10/21, but did not answer. A voicemail was left for the patient to return our call on .     Patient was outreached on 10/15, 10/17 and 10/21, but did not answer nor could a voicemail be left on .

## 2024-10-14 NOTE — DISCHARGE NOTE PROVIDER - CARE PROVIDER_API CALL
Randal Barr  Internal Medicine  [address] [place] [address]  Phone: [phone]  Fax: [phone]  Established Patient  Follow Up Time: 1 week   Randal Barr  Internal Medicine  [address] [place] [address]  Phone: [phone]  Fax: [phone]  Established Patient  Follow Up Time: 1 week    LUIS LÓPEZ  [address] [place] [address]  Phone: [phone]  Fax: ()-  Established Patient  Follow Up Time: 1-3 days   Alex Read  Ophthalmology  [address] [place] [address] [address]  [place] [address]  Phone: [phone]  Fax: [phone]  Scheduled Appointment: 10/16/2024 01:45 PM    Randal Barr  Internal Medicine  [address] [place] [address]  Phone: [phone]  Fax: [phone]  Established Patient  Follow Up Time: 1 week    ESTHER BERNARD MD  Phone: [phone]  Fax: ()-  Follow Up Time: 1 week

## 2024-10-14 NOTE — DISCHARGE NOTE PROVIDER - HOSPITAL COURSE
64 M, PMHx HTN, DM, CAD s/p stent x2 ([ast on Aug 24], HLD, MAC infection [taking Rifampicin, Ethambutol and Azithro]. p/w blurring of vision, started 2 weeks ago, acute in onset, worse over last 2-3 days. Patient was seen at opthalmology clinic, Dr. Yolette Villalobos, the day before presenting to the ED. Patient was referred to [facility] for emergent treatment, however came to [facility] due to proximity. In ED, CBC, CMP, PT/INR all unremarkable. Patient was admitted to telemetry for r/o CVA vs optic neuritis. MRI brain and orbit neg for optic neuritis and acute ischemia. HIV and RPR neg. Pending neuro eval.       Admitted to telemetry to r/o CVA vs optic neuritis 2/2 ethambutol. CTH w/o contrast, CTA H&N wnl.  Pt's o/p ophthalmologist was contacted to discuss exam findings. ophthalmologist Dr. Douglas, who stated that findings from his exam were inconclusive for optic neuritis and that neuro work up is required. MRI brain and orbit with contrast ordered to evaluate optic nerve and r/o acute ischemia. 64 M, PMHx HTN, DM, CAD s/p stent x2 ([ast on Aug 24], HLD, MAC infection [taking Rifampicin, Ethambutol and Azithro]. p/w blurring of vision, started 2 weeks ago, acute in onset, worse over last 2-3 days. Patient was seen at opthalmology clinic, Dr. Yolette Villalobos, the day before presenting to the ED. Patient was referred to [facility] emergent [facility] In ED, CBC, CMP, PT/INR all unremarkable. Patient was admitted to telemetry for r/o CVA vs optic neuritis. Patient was continued on home medications for MAC infection except ethambutol as it was a possible offending agent. MRI brain and orbit neg for optic neuritis and acute ischemia. Echo performed revealed LVEF 71%, mild (grade 1) left ventricular diastolic dysfunction. HIV and RPR neg. Pending neuro eval. 64 M, PMHx HTN, DM, CAD s/p stent x2 ([ast on Aug 24], HLD, MAC infection [taking Rifampicin, Ethambutol and Azithro]. p/w blurring of vision, started 2 weeks ago, acute in onset, worse over last 2-3 days. Patient was seen at opthalmology clinic, Dr. Yolette Villalobos, the day before presenting to the ED. Patient was referred to [facility] for emergent treatment, however came to [facility] due to proximity. In ED, CBC, CMP, PT/INR all unremarkable. Patient was admitted to telemetry for r/o CVA vs optic neuritis. Patient was continued on home medications for MAC infection except ethambutol as it was a possible offending agent. Collateral information was obtained from outpatient opthalmaolgist, who was not concerned for optic neuritis either. Patient is known to have cataracts, however no changes were seen in the optic nerve that were of concern as per ophthalmologist. MRI brain and orbit neg for optic neuritis and acute ischemia. Lipid panel was abnormal and hba1c 7.5. Echo performed revealed LVEF 71%, mild (grade 1) left ventricular diastolic dysfunction. HIV and RPR neg.    Given patient's improved clinical status and current hemodynamic stability, decision was made to discharge the patient. Patient is stable for discharge per attending and is advised to follow up with PCP as outpatient. Please refer to patient's complete medical chart with documents for a full hospital course, for this is only a brief summary. 64 M, PMHx HTN, DM, CAD s/p stent x2 ([ast on Aug 24], HLD, MAC infection [taking Rifampicin, Ethambutol and Azithro]. p/w blurring of vision, started 2 weeks ago, acute in onset, worse over last 2-3 days. Patient was seen at opthalmology clinic, Dr. Yolette Villalobos, the day before presenting to the ED. Patient was referred to [facility] for emergent treatment, however came to [facility] due to proximity. In ED, CBC, CMP, PT/INR all unremarkable. Patient was admitted to telemetry for r/o CVA vs optic neuritis. Patient was continued on home medications for MAC infection except ethambutol as it was a possible offending agent. Collateral information was obtained from outpatient ophthalmologist, who was not concerned for optic neuritis either. Patient is known to have cataracts, however no changes were seen in the optic nerve that were of concern as per ophthalmologist. MRI brain and orbit neg for optic neuritis and acute ischemia. Lipid panel was abnormal and hba1c 7.5. Echo performed revealed LVEF 71%, mild (grade 1) left ventricular diastolic dysfunction. HIV and RPR neg.    Given patient's improved clinical status and current hemodynamic stability, decision was made to discharge the patient. Patient is stable for discharge per attending and is advised to follow up with PCP as outpatient. Please refer to patient's complete medical chart with documents for a full hospital course, for this is only a brief summary. 64 M, PMHx HTN, DM, CAD s/p stent x2 (Last on Aug 24), HLD, MAC infection [taking Rifampicin, Ethambutol and Azithro]. p/w blurring of vision, started 2 weeks ago, acute in onset, worse over last 2-3 days. Patient was seen at opthalmology clinic, Dr. Yolette Villalobos, the day before presenting to the ED. Patient was [facility], however came to [facility] due to proximity. In ED, CBC, CMP, PT/INR all unremarkable. Patient was admitted to telemetry for r/o CVA vs optic neuritis. Patient was continued on home medications for MAC infection except ethambutol as it was a possible offending agent. Collateral information was obtained from outpatient ophthalmologist, who was not concerned for optic neuritis either. Patient is known to have cataracts, however no changes were seen in the optic nerve that were of concern as per ophthalmologist. MRI brain and orbit neg for optic neuritis and acute ischemia. Lipid panel was abnormal and hba1c 7.5. Echo performed revealed LVEF 71%, mild (grade 1) left ventricular diastolic dysfunction. HIV and RPR neg.    Given patient's improved clinical status and current hemodynamic stability, decision was made to discharge the patient. Patient is stable for discharge per attending and is advised to follow up with PCP as outpatient. Please refer to patient's complete medical chart with documents for a full hospital course, for this is only a brief summary.

## 2024-10-14 NOTE — CONSULT NOTE ADULT - SUBJECTIVE AND OBJECTIVE BOX
Time of visit:    CHIEF COMPLAINT: Patient is a 64y old  Male who presents with a chief complaint of CVA vs optic neuritis (14 Oct 2024 07:07)      HPI:  64y/M, from home, with PMH of HTN, DM, CAD s/p stent X2 (last on Aug 24), HLD, MAC infection (taking Rifampicin, Ethambutol and Azithro). presents to ED with blurring of vision, started 2 weeks ago, acute in onset, worse over last 2-3 days. States he cannot see well in dim lights, denies pain, photophobia or focal vision loss. States he had an episode of fever 2 days ago and had one episode of vomiting, NBNB. Denies neck rigidity. Denies focal weakness and numbness. As per the patient he went to his eye doctor today, there he was told he has issues with his right eye after examination and was told to go to  [facility] for ophthalmic evaluation, but he came to [facility] as this was closer to his home. States he is open to transfer to [facility] if needed.  Patient mentions he started taking 3 antibiotics for 'bacterial lung infection' 9m ago and follows up with Dr Aydin Patel outpt.   (10 Oct 2024 23:57)   Patient seen and examined.     PAST MEDICAL & SURGICAL HISTORY:  DM (diabetes mellitus)  for 10 years      HTN (hypertension)  5 years ago      CAD (coronary artery disease)  2 stents      Stented coronary artery  one 9 years ago and other 3 months ago      S/P CABG x 2          Allergies    No Known Allergies    Intolerances        MEDICATIONS  (STANDING):  amLODIPine   Tablet 5 milliGRAM(s) Oral daily  aspirin enteric coated 81 milliGRAM(s) Oral daily  atorvastatin 80 milliGRAM(s) Oral at bedtime  azithromycin   Tablet 500 milliGRAM(s) Oral daily  cetirizine 5 milliGRAM(s) Oral once  clopidogrel Tablet 75 milliGRAM(s) Oral daily  enoxaparin Injectable 40 milliGRAM(s) SubCutaneous every 24 hours  ezetimibe 10 milliGRAM(s) Oral daily  insulin lispro (ADMELOG) corrective regimen sliding scale   SubCutaneous three times a day before meals  insulin lispro (ADMELOG) corrective regimen sliding scale   SubCutaneous at bedtime  losartan 100 milliGRAM(s) Oral daily  metoprolol tartrate 50 milliGRAM(s) Oral two times a day  rifAMPin 600 milliGRAM(s) Oral daily      MEDICATIONS  (PRN):   Medications up to date at time of exam.    Medications up to date at time of exam.    FAMILY HISTORY:      SOCIAL HISTORY  Smoking History: Denies smoking/smoke exposure.  Living Condition: [   ] apartment, [   ] private house  Work History:   Travel History: denies recent travel  Illicit Substance Use: denies  Alcohol Use: denies    REVIEW OF SYSTEMS:    CONSTITUTIONAL:  No fevers, chills. Denies night sweats.    HEENT:  Denies eye pain on exam. No , sore throat nor runny nose.    CARDIOVASCULAR:  Denies chest discomfort.     RESPIRATORY:  Denies SOB. No cough and no  wheezing.    GASTROINTESTINAL:  Denies abdominal pain. No vomiting on exam.     GENITOURINARY: Denies dysuria.    NEUROLOGIC:  Denies numbness, tingling or weakness. No seizure .      PSYCHIATRIC:  No emotional distress.     PHYSICAL EXAMINATION:      Vital Signs Last 24 Hrs  T(C): 37 (14 Oct 2024 11:43), Max: 37 (14 Oct 2024 11:43)  T(F): 98.6 (14 Oct 2024 11:43), Max: 98.6 (14 Oct 2024 11:43)  HR: 72 (14 Oct 2024 11:43) (66 - 78)  BP: 120/86 (14 Oct 2024 11:43) (115/80 - 140/90)  BP(mean): 96 (13 Oct 2024 20:43) (96 - 96)  RR: 18 (14 Oct 2024 11:43) (17 - 18)  SpO2: 100% (14 Oct 2024 11:43) (95% - 100%)    Parameters below as of 14 Oct 2024 11:43  Patient On (Oxygen Delivery Method): room air       (if applicable)    General: Alert and oriented. Able to answer question with no SOB. No acute distress.     HEENT: Head is normocephalic and atraumatic. Extraocular muscles are intact. Mucous membranes are moist.     NECK: Supple, no palpable adenopathy.    LUNGS: Clear to auscultation bilaterally with  no wheezing, rales, or rhonchi. No use of accessory muscle.     HEART: S1 S2 Regular rate and rhythm without murmur.    ABDOMEN: Soft, nontender, and nondistended.  Active bowel sounds.     EXTREMITIES: Without any cyanosis, clubbing, rash, lesions or edema.    NEUROLOGIC: Awake, alert, oriented.     SKIN: Warm, dry, good turgor.      LABS:                        13.8   5.87  )-----------( 200      ( 14 Oct 2024 05:10 )             39.0     10-14    134[L]  |  101  |  16  ----------------------------<  239[H]  4.1   |  24  |  0.79    Ca    8.8      14 Oct 2024 05:10  Phos  3.6     10-14  Mg     1.9     10-14    TPro  6.9  /  Alb  3.8  /  TBili  0.3  /  DBili  x   /  AST  18  /  ALT  32  /  AlkPhos  42  10-14      Urinalysis Basic - ( 14 Oct 2024 05:10 )    Color: x / Appearance: x / SG: x / pH: x  Gluc: 239 mg/dL / Ketone: x  / Bili: x / Urobili: x   Blood: x / Protein: x / Nitrite: x   Leuk Esterase: x / RBC: x / WBC x   Sq Epi: x / Non Sq Epi: x / Bacteria: x                MICROBIOLOGY: (if applicable)    RADIOLOGY & ADDITIONAL STUDIES:  EKG: < from: MR Orbits w/ IV Cont (10.11.24 @ 13:11) >    ACC: [id number] EXAM:  MR BRAIN IC   ORDERED BY: EDVIN MARIN     ACC: [id number] EXAM:  MR ORBITS ONLY IC   ORDERED BY: EDVIN MARIN     PROCEDURE DATE:  10/11/2024          INTERPRETATION:  MRI BRAIN AND ORBITS WITHOUT AND WITH CONTRAST    HISTORY: r/o optic neuritis.    COMPARISON: MRI brain from 10/10/2024.    TECHNIQUE: MRI of the brain and orbits with and without contrast was   performed after the administration of intravenous 0.1 mmol/kg Gadavist.  IV Contrast: Gadavist  7.5 cc administered  0 cc discarded.    FINDINGS:    MRI BRAIN:    No restricted diffusion signal to suspect recent ischemia. No   intracranial hemorrhages , midline shift or mass effect are demonstrated.   No unexpected susceptibility on gradient sequences. FociT2/FLAIR   hyperintensity scattered in periventricular subcortical deep white matter   nonspecific and likely on the basis of chronic microangiopathy. There is   diffuse parenchymal volume loss and associated prominence of sulci and   lateral ventricles. Brainstem is normal in size and signal. Pituitary   region is unremarkable.    The ventricles are normal in size and configuration. The   perimesencephalic cisterns are intact. No hydrocephalus is evident.    No abnormal collection in extra-axialspaces is noted.    Flow-voids in the major vascular structures is maintained.    Mild mucosal thickening in ethmoidal sinuses. Mastoids are intact.   Sclerae technique    MRI ORBITS:    The globes are intact. The extraocular muscles are normal in size and   signal without abnormal enhancement. The intra and extraconal fat is   normal in signal. The lacrimal glands are normal in size and enhancement.   No intraorbital mass or abnormal enhancement is seen. Motion degraded   imaging of the optic nerves. Accounting for motion, no abnormal   enhancement of optic nerve postcontrast sequences. The optic chiasm and   optic tracts are unremarkable. The cavernous sinus and suprasellar   cistern are normal in appearance.    IMPRESSION:    MRI ORBIT    Motion degraded imaging of the optic nerves.    Accounting for limits no evidence of optic neuritis. No intraorbital   lesions or mass effect.    BRAIN:    No acute cerebral ischemia, intracranial hemorrhages or space-occupying   lesions.    Chronic involutional changes.    --- End of Report ---            RIVERA SINGH DO; Attending Radiologist  This document has been electronically signed. Oct 11 2024  3:03PM    < end of copied text >    CXR: < from: Xray Chest 1 View-PORTABLE IMMEDIATE (Xray Chest 1 View-PORTABLE IMMEDIATE .) (10.11.24 @ 16:47) >    ACC: [id number] EXAM:  XR CHEST PORTABLE IMMED 1V   ORDERED BY: AYAAN SIERRA     PROCEDURE DATE:  10/11/2024          INTERPRETATION:  AP semierect chest on October 11, 2024 at 4:30 PM.   Patient has visual disturbance.    Heart magnified by technique. Elongated aorta noted.    Lungs are clear and chest is similar to January 10 this year.    IMPRESSION: No acute finding or change.    --- End of Report ---            RAMAN COHEN MD; Attending Radiologist  This document has been electronicallysigned. Oct 11 2024  4:49PM    < end of copied text >    ECHO:    IMPRESSION: 64y Male PAST MEDICAL & SURGICAL HISTORY:  DM (diabetes mellitus)  for 10 years      HTN (hypertension)  5 years ago      CAD (coronary artery disease)  2 stents      Stented coronary artery  one 9 years ago and other 3 months ago      S/P CABG x 2       Impression: This is a 65 Y/O Male Presented to ED with Acute onset of blurring of vision, started 2 weeks ago and worsened over last 2-3 days. States he cannot see well in dim lights. States he had an episode of fever 2 days ago and had one episode of vomiting. For pulmonary evaluation due to Ethambutol was held due to Optic Neuritis and has  MAC infection (taking Rifampicin, Ethambutol and Azithro that he started 9 Months ago ). Admitted to r/o CVA vs optic neuritis. MRI brain and orbit negative for optic neuritis and acute ischemia. HIV and RPR negative.    Suggestion:  O2 saturation 98% room air. So far saturating good room air and no reported episode of Hypoxia .  On azithromycin 500 mg Oral daily.  On Rifampin 600 mg Oral daily. Monthly LFT.   Pulmonary follow up outpatient .   DVT / GI prophylactic. 
Neurology Consult Note    Chief Complaint: Blurry vision X 2weeks.       HPI: 64y/M, from home, with PMH of HTN, DM, CAD s/p stent X2 (last on Aug 24), HLD, MAC infection (taking Rifampicin, Ethambutol and Azithro). presents to ED with blurring of vision, started 2 weeks ago, acute in onset, worse over last 2-3 days. States he cannot see well in dim lights, denies pain, photophobia or focal vision loss. States he had an episode of fever 2 days ago and had one episode of vomiting, NBNB. Denies neck rigidity. Denies focal weakness and numbness. As per the patient he went to his eye doctor( unclear of the name), there he was told he has issues with his right eye after examination and was told to go to  [facility] for ophthalmic evaluation, but he came to [facility] as this was closer to his home. States he is open to transfer to [facility] if needed.  Patient mentions he started taking 3 antibiotics for 'bacterial lung infection' 9m ago and follows up with Dr Aydin Patel outpt. Per chart review, pt takes Ethambutol 2000mg since 2/2024.   Pt also states, he has HA, N/V and worsening Blurry vision when his BP is elevated(around 170/110) and then these symptoms improve with decrease in BP. Checks his BP @ home. Denies any double vision. No eye pain with EOMs.       PAST MEDICAL & SURGICAL HISTORY:  DM (diabetes mellitus)  for 10 years  HTN (hypertension)  5 years ago  CAD (coronary artery disease)  2 stents  Stented coronary artery  one 9 years ago and other 3 months ago  S/P CABG x 2          ROS:  Constitutional: No fever, weight loss or fatigue  Eyes: No eye pain, blurry vision X 2weeks  ENMT:  No difficulty hearing, tinnitus, vertigo;  No sinus or throat pain  Neck: No pain or stiffness  Respiratory: No cough, wheezing, chills or hemoptysis  Cardiovascular: No chest pain, palpitations, shortness of breath, dizziness or leg swelling  Gastrointestinal: No abdominal pain. No nausea, vomiting or hematemesis; No diarrhea or constipation.   Genitourinary: No dysuria, frequency, hematuria or incontinence  Neurological: As per HPI      MEDICATIONS  (STANDING):  amLODIPine   Tablet 5 milliGRAM(s) Oral daily  aspirin enteric coated 81 milliGRAM(s) Oral daily  atorvastatin 80 milliGRAM(s) Oral at bedtime  azithromycin   Tablet 500 milliGRAM(s) Oral daily  clopidogrel Tablet 75 milliGRAM(s) Oral daily  enoxaparin Injectable 40 milliGRAM(s) SubCutaneous every 24 hours  ezetimibe 10 milliGRAM(s) Oral daily  insulin lispro (ADMELOG) corrective regimen sliding scale   SubCutaneous three times a day before meals  insulin lispro (ADMELOG) corrective regimen sliding scale   SubCutaneous at bedtime  losartan 100 milliGRAM(s) Oral daily  metoprolol tartrate 50 milliGRAM(s) Oral two times a day  rifAMPin 600 milliGRAM(s) Oral daily    MEDICATIONS  (PRN):      Allergies  No Known Allergies      Vital Signs Last 24 Hrs  T(C): 37 (14 Oct 2024 11:43), Max: 37 (14 Oct 2024 11:43)  T(F): 98.6 (14 Oct 2024 11:43), Max: 98.6 (14 Oct 2024 11:43)  HR: 72 (14 Oct 2024 11:43) (66 - 74)  BP: 120/86 (14 Oct 2024 11:43) (120/86 - 140/90)  BP(mean): 96 (13 Oct 2024 20:43) (96 - 96)  RR: 18 (14 Oct 2024 11:43) (17 - 18)  SpO2: 100% (14 Oct 2024 11:43) (95% - 100%)    Parameters below as of 14 Oct 2024 11:43  Patient On (Oxygen Delivery Method): room air        Physical exam:  General: No acute distress, awake and alert  Cardiovascular: Regular rate and rhythm, S1S2+.   Pulmonary: Anterior breath sounds clear bilaterally, no crackles or wheezing. No use of accessory muscles      Neurologic:  -Mental status: Awake, alert, oriented to person, place, and time. Speech is fluent with intact naming, repetition, and comprehension, no dysarthria. Recent and remote memory intact. Follows commands. Attention/concentration intact. Fund of knowledge appropriate.  -Cranial nerves:   II: Visual fields are full to confrontation.  III, IV, VI: Extraocular movements are intact without nystagmus. Pupils equally round and reactive to light.  V:  Facial sensation V1-V3 equal and intact   VII: Face is symmetric with normal eye closure and smile  VIII: Hearing is bilaterally intact to finger rub  IX, X: Uvula is midline and soft palate rises symmetrically  XI: Head turning and shoulder shrug are intact.  XII: Tongue protrudes midline  Motor: Normal bulk and tone. No pronator drift. Strength bilateral upper extremity 5/5, bilateral lower extremities 5/5.  Rapid alternating movements intact and symmetric  Sensation: Intact to light touch bilaterally. No neglect or extinction on double simultaneous testing.  Coordination: No dysmetria on finger-to-nose and heel-to-shin bilaterally  Reflexes: Downgoing toes bilaterally   Gait: Narrow gait and steady    NIHSS: 0  mRS: 0      LABS:                        13.8   5.87  )-----------( 200      ( 14 Oct 2024 05:10 )             39.0     10-14    134[L]  |  101  |  16  ----------------------------<  239[H]  4.1   |  24  |  0.79    Ca    8.8      14 Oct 2024 05:10  Phos  3.6     10-14  Mg     1.9     10-14    TPro  6.9  /  Alb  3.8  /  TBili  0.3  /  DBili  x   /  AST  18  /  ALT  32  /  AlkPhos  42  10-14      Urinalysis Basic - ( 14 Oct 2024 05:10 )    Color: x / Appearance: x / SG: x / pH: x  Gluc: 239 mg/dL / Ketone: x  / Bili: x / Urobili: x   Blood: x / Protein: x / Nitrite: x   Leuk Esterase: x / RBC: x / WBC x   Sq Epi: x / Non Sq Epi: x / Bacteria: x        RADIOLOGY & ADDITIONAL TESTS:  CT Angio Head w/ IV Cont (10.10.24 @ 22:23)   IMPRESSION:    CT HEAD:  No acute intracranial hemorrhage, mass effect, or evidence of acute   vascular territorial infarction.    CTA NECK:  No evidence of significant stenosis or occlusion.    CTA HEAD:  No large vessel occlusion, significant stenosis or vascular abnormality   identified.    MR Head w/ IV Cont (10.11.24 @ 13:12)   IMPRESSION:    MRI ORBIT    Motion degraded imaging of the optic nerves.    Accounting for limits no evidence of optic neuritis. No intraorbital   lesions or mass effect.    BRAIN:    No acute cerebral ischemia, intracranial hemorrhages or space-occupying   lesions.    Chronic involutional changes.

## 2024-10-15 NOTE — CHART NOTE - NSCHARTNOTEFT_GEN_A_CORE
I called Dr. Randal Barr at [phone] and left a message regarding this patient's reason for admission and f/u with another ophthalmologist.
Patient was outreached but did not answer. A voicemail was left for the patient to return our call.

## 2024-11-26 PROCEDURE — 87389 HIV-1 AG W/HIV-1&-2 AB AG IA: CPT

## 2024-11-26 PROCEDURE — 93005 ELECTROCARDIOGRAM TRACING: CPT

## 2024-11-26 PROCEDURE — 71045 X-RAY EXAM CHEST 1 VIEW: CPT

## 2024-11-26 PROCEDURE — 84100 ASSAY OF PHOSPHORUS: CPT

## 2024-11-26 PROCEDURE — 85610 PROTHROMBIN TIME: CPT

## 2024-11-26 PROCEDURE — 80048 BASIC METABOLIC PNL TOTAL CA: CPT

## 2024-11-26 PROCEDURE — 70542 MRI ORBIT/FACE/NECK W/DYE: CPT | Mod: MC

## 2024-11-26 PROCEDURE — 70552 MRI BRAIN STEM W/DYE: CPT | Mod: MC

## 2024-11-26 PROCEDURE — 85025 COMPLETE CBC W/AUTO DIFF WBC: CPT

## 2024-11-26 PROCEDURE — 85027 COMPLETE CBC AUTOMATED: CPT

## 2024-11-26 PROCEDURE — 70496 CT ANGIOGRAPHY HEAD: CPT | Mod: MC

## 2024-11-26 PROCEDURE — 82962 GLUCOSE BLOOD TEST: CPT

## 2024-11-26 PROCEDURE — 80053 COMPREHEN METABOLIC PANEL: CPT

## 2024-11-26 PROCEDURE — 86780 TREPONEMA PALLIDUM: CPT

## 2024-11-26 PROCEDURE — 36415 COLL VENOUS BLD VENIPUNCTURE: CPT

## 2024-11-26 PROCEDURE — 93306 TTE W/DOPPLER COMPLETE: CPT

## 2024-11-26 PROCEDURE — 83036 HEMOGLOBIN GLYCOSYLATED A1C: CPT

## 2024-11-26 PROCEDURE — 99285 EMERGENCY DEPT VISIT HI MDM: CPT | Mod: 25

## 2024-11-26 PROCEDURE — 70498 CT ANGIOGRAPHY NECK: CPT | Mod: MC

## 2024-11-26 PROCEDURE — 80061 LIPID PANEL: CPT

## 2024-11-26 PROCEDURE — 83735 ASSAY OF MAGNESIUM: CPT

## 2024-11-26 PROCEDURE — A9585: CPT

## 2024-11-26 PROCEDURE — 85730 THROMBOPLASTIN TIME PARTIAL: CPT

## 2024-11-26 PROCEDURE — 84443 ASSAY THYROID STIM HORMONE: CPT

## 2024-12-20 ENCOUNTER — EMERGENCY (EMERGENCY)
Facility: HOSPITAL | Age: 64
LOS: 1 days | Discharge: HOME | End: 2024-12-20
Attending: STUDENT IN AN ORGANIZED HEALTH CARE EDUCATION/TRAINING PROGRAM
Payer: COMMERCIAL

## 2024-12-20 VITALS
SYSTOLIC BLOOD PRESSURE: 142 MMHG | TEMPERATURE: 98 F | DIASTOLIC BLOOD PRESSURE: 89 MMHG | HEART RATE: 74 BPM | WEIGHT: 165.35 LBS | RESPIRATION RATE: 19 BRPM | OXYGEN SATURATION: 98 %

## 2024-12-20 DIAGNOSIS — Z95.1 PRESENCE OF AORTOCORONARY BYPASS GRAFT: Chronic | ICD-10-CM

## 2024-12-20 LAB
ALBUMIN SERPL ELPH-MCNC: 3.7 G/DL — SIGNIFICANT CHANGE UP (ref 3.5–5)
ALP SERPL-CCNC: 35 U/L — LOW (ref 40–120)
ALT FLD-CCNC: 20 U/L DA — SIGNIFICANT CHANGE UP (ref 10–60)
ANION GAP SERPL CALC-SCNC: 7 MMOL/L — SIGNIFICANT CHANGE UP (ref 5–17)
AST SERPL-CCNC: 15 U/L — SIGNIFICANT CHANGE UP (ref 10–40)
BASOPHILS # BLD AUTO: 0.03 K/UL — SIGNIFICANT CHANGE UP (ref 0–0.2)
BASOPHILS NFR BLD AUTO: 0.6 % — SIGNIFICANT CHANGE UP (ref 0–2)
BILIRUB SERPL-MCNC: 0.3 MG/DL — SIGNIFICANT CHANGE UP (ref 0.2–1.2)
BUN SERPL-MCNC: 9 MG/DL — SIGNIFICANT CHANGE UP (ref 7–18)
CALCIUM SERPL-MCNC: 8.4 MG/DL — SIGNIFICANT CHANGE UP (ref 8.4–10.5)
CHLORIDE SERPL-SCNC: 103 MMOL/L — SIGNIFICANT CHANGE UP (ref 96–108)
CO2 SERPL-SCNC: 26 MMOL/L — SIGNIFICANT CHANGE UP (ref 22–31)
CREAT SERPL-MCNC: 0.78 MG/DL — SIGNIFICANT CHANGE UP (ref 0.5–1.3)
EGFR: 98 ML/MIN/1.73M2 — SIGNIFICANT CHANGE UP
EOSINOPHIL # BLD AUTO: 0.53 K/UL — HIGH (ref 0–0.5)
EOSINOPHIL NFR BLD AUTO: 10.5 % — HIGH (ref 0–6)
GLUCOSE SERPL-MCNC: 93 MG/DL — SIGNIFICANT CHANGE UP (ref 70–99)
HCT VFR BLD CALC: 36.3 % — LOW (ref 39–50)
HGB BLD-MCNC: 12.4 G/DL — LOW (ref 13–17)
IMM GRANULOCYTES NFR BLD AUTO: 0.2 % — SIGNIFICANT CHANGE UP (ref 0–0.9)
LYMPHOCYTES # BLD AUTO: 1.51 K/UL — SIGNIFICANT CHANGE UP (ref 1–3.3)
LYMPHOCYTES # BLD AUTO: 29.9 % — SIGNIFICANT CHANGE UP (ref 13–44)
MAGNESIUM SERPL-MCNC: 1.8 MG/DL — SIGNIFICANT CHANGE UP (ref 1.6–2.6)
MCHC RBC-ENTMCNC: 30.5 PG — SIGNIFICANT CHANGE UP (ref 27–34)
MCHC RBC-ENTMCNC: 34.2 G/DL — SIGNIFICANT CHANGE UP (ref 32–36)
MCV RBC AUTO: 89.4 FL — SIGNIFICANT CHANGE UP (ref 80–100)
MONOCYTES # BLD AUTO: 0.69 K/UL — SIGNIFICANT CHANGE UP (ref 0–0.9)
MONOCYTES NFR BLD AUTO: 13.7 % — SIGNIFICANT CHANGE UP (ref 2–14)
NEUTROPHILS # BLD AUTO: 2.28 K/UL — SIGNIFICANT CHANGE UP (ref 1.8–7.4)
NEUTROPHILS NFR BLD AUTO: 45.1 % — SIGNIFICANT CHANGE UP (ref 43–77)
NRBC # BLD: 0 /100 WBCS — SIGNIFICANT CHANGE UP (ref 0–0)
NT-PROBNP SERPL-SCNC: 200 PG/ML — HIGH (ref 0–125)
PHOSPHATE SERPL-MCNC: 4 MG/DL — SIGNIFICANT CHANGE UP (ref 2.5–4.5)
PLATELET # BLD AUTO: 194 K/UL — SIGNIFICANT CHANGE UP (ref 150–400)
POTASSIUM SERPL-MCNC: 4.4 MMOL/L — SIGNIFICANT CHANGE UP (ref 3.5–5.3)
POTASSIUM SERPL-SCNC: 4.4 MMOL/L — SIGNIFICANT CHANGE UP (ref 3.5–5.3)
PROT SERPL-MCNC: 6.5 G/DL — SIGNIFICANT CHANGE UP (ref 6–8.3)
RBC # BLD: 4.06 M/UL — LOW (ref 4.2–5.8)
RBC # FLD: 12.8 % — SIGNIFICANT CHANGE UP (ref 10.3–14.5)
SODIUM SERPL-SCNC: 136 MMOL/L — SIGNIFICANT CHANGE UP (ref 135–145)
TROPONIN I, HIGH SENSITIVITY RESULT: 6.9 NG/L — SIGNIFICANT CHANGE UP
WBC # BLD: 5.05 K/UL — SIGNIFICANT CHANGE UP (ref 3.8–10.5)
WBC # FLD AUTO: 5.05 K/UL — SIGNIFICANT CHANGE UP (ref 3.8–10.5)

## 2024-12-20 PROCEDURE — 71045 X-RAY EXAM CHEST 1 VIEW: CPT

## 2024-12-20 PROCEDURE — 84484 ASSAY OF TROPONIN QUANT: CPT

## 2024-12-20 PROCEDURE — 83880 ASSAY OF NATRIURETIC PEPTIDE: CPT

## 2024-12-20 PROCEDURE — 71045 X-RAY EXAM CHEST 1 VIEW: CPT | Mod: 26

## 2024-12-20 PROCEDURE — 82962 GLUCOSE BLOOD TEST: CPT

## 2024-12-20 PROCEDURE — 85025 COMPLETE CBC W/AUTO DIFF WBC: CPT

## 2024-12-20 PROCEDURE — 99283 EMERGENCY DEPT VISIT LOW MDM: CPT | Mod: 25

## 2024-12-20 PROCEDURE — 80053 COMPREHEN METABOLIC PANEL: CPT

## 2024-12-20 PROCEDURE — 93005 ELECTROCARDIOGRAM TRACING: CPT

## 2024-12-20 PROCEDURE — 36415 COLL VENOUS BLD VENIPUNCTURE: CPT

## 2024-12-20 PROCEDURE — 99285 EMERGENCY DEPT VISIT HI MDM: CPT

## 2024-12-20 PROCEDURE — 84100 ASSAY OF PHOSPHORUS: CPT

## 2024-12-20 PROCEDURE — 83735 ASSAY OF MAGNESIUM: CPT

## 2024-12-20 NOTE — ED PROVIDER NOTE - AVIAN FLU SYMPTOMS
General Sunscreen Counseling: I recommended a zinc or titanium- based sunscreen with a SPF of 30 or higher. I explained that SPF 30 sunscreens block approximately 97 percent of the sun's harmful rays. Sunscreens should be applied at least 15 minutes prior to expected sun exposure and then every 2 hours after that as long as sun exposure continues. If swimming or exercising sunscreen should be reapplied every 45 minutes to an hour after getting wet or sweating. One ounce, or the equivalent of a shot glass full of sunscreen, is adequate to protect the skin not covered by a bathing suit. I also recommended a lip balm with a sunscreen as well. Sun protective clothing can be used in lieu of sunscreen but must be worn the entire time you are exposed to the sun's rays.
Detail Level: Detailed
Products Recommended: Sunblock recommendation sheet available to patient
No

## 2024-12-20 NOTE — ED POST DISCHARGE NOTE - CERTIFIED LETTER SENT
Validated. Appointment is appropriate:  Yes      Labs completed/ordered for upcoming appointment:  N/A  Diagnostic completed or scheduled prior to appointment: NA  External records requested: N/A  Date of last visit:  10/15/2024  ASSESSMENT:  1. Gastroesophageal reflux disease with esophagitis, unspecified whether hemorrhage    2. Decreased appetite    3. Nausea    4. Chronic constipation       PLAN:  Continue PPI and sucralfate.  GERD guidelines.  Upper endoscopy for further evaluation.  Trial linaclotide 145 mcg daily.  [place] fluids, dietary fiber, walking after meals.     ADDENDUM:  Insurance would not cover linaclotide.  Replaced with lubiprostone 24 mcg BID.     Follow up a few weeks after procedure.     Reason for visit:  Follow up after procedure    Date of last procedure:  11/22/2024- EGD    ASSESSMENT:   GE reflux, gastritis, postoperative anatomy.     PLAN:  1.  Usual post-procedural monitoring.  2.  Continue PPI daily but with rabeprazole instead.  3.  Soft diet advance as tolerated.  4.  Follow reflux and bariatric dietary precautions.  5.  Minimize NSAIDs and other gastric irritants.  6.  Review specimen findings and progress in GI clinic.    06/02/2021- Colonoscopy  IMPRESSION:  1. Sigmoid diverticulosis  2. Melanosis coli  3. Otherwise, normal colonoscopy and ileoscopy, random biopsies obtained     RECOMMENDATIONS:  1. Await pathology  2. Treatment toward chronic functional bowel disorder as best as possible  3. GI clinic follow-up as scheduled  4. Screening colonoscopy in 10 years time   Lab Results   Component Value Date/Time    WBC 8.0 09/25/2024 07:57 AM    WBC 6.5 05/02/2018 07:21 AM    RBC 5.44 (H) 09/25/2024 07:57 AM    RBC 4.75 05/02/2018 07:21 AM    HGB 15.5 09/25/2024 07:57 AM    HGB 14.2 05/02/2018 07:21 AM    HCT 48.0 (H) 09/25/2024 07:57 AM    HCT 43.9 05/02/2018 07:21 AM    MCV 88.2 09/25/2024 07:57 AM    MCV 92.4 05/02/2018 07:21 AM     09/25/2024 07:57 AM     05/02/2018  07:21 AM    ANEUT 5.1 09/25/2024 07:57 AM    ANEUT 4.3 05/02/2018 07:21 AM    ALYMS 1.9 09/25/2024 07:57 AM    ALYMS 1.5 05/02/2018 07:21 AM    IRINA 0.6 09/25/2024 07:57 AM    IRINA 0.5 05/02/2018 07:21 AM    AEOS 0.3 09/25/2024 07:57 AM    AEOS 0.2 05/02/2018 07:21 AM    ABASO 0.1 09/25/2024 07:57 AM    ABASO 0.0 05/02/2018 07:21 AM     Recent Labs   Lab 09/25/24  0757   Glucose 105*   Sodium 142   Potassium 3.6   Chloride 102   BUN 8   Creatinine 0.74   Calcium 9.0   Albumin 3.7   GOT/AST 12   Alkaline Phosphatase 89   GPT 17   Anion Gap 11   Globulin 3.8   A/G Ratio 1.0     DIRECT BILIRUBIN (mg/dL)   Date Value   11/05/2017 0.2   01/19/2017 0.2     C-Reactive Protein (mg/dL)   Date Value   06/20/2023 <0.3       RBC Sedimentation Rate (mm/hr)   Date Value   06/20/2023 22 (H)   04/19/2023 24 (H)     Tissue Transglutaminase Antibody, IgA (Units)   Date Value   04/16/2021 5     Tissue Transglutaminase Antibody, IgG (Units)   Date Value   04/16/2021 2     Gliadin Antibody, IgA Deamidated (Units)   Date Value   04/16/2021 4     Gliadin Antibody, IgG Deamidated (UNITS)   Date Value   04/16/2021 2       Biologics:  NA  GI Medications:    GASTROINTESTINAL   Medication    rabeprazole    sucralfate    lubiprostone     Date of Last Infusion: N/a     Yes

## 2024-12-20 NOTE — ED ADULT NURSE NOTE - NSFALLRISKINTERV_ED_ALL_ED

## 2024-12-20 NOTE — ED PROVIDER NOTE - CLINICAL SUMMARY MEDICAL DECISION MAKING FREE TEXT BOX
Adult male with multiple medical comorbidities comes in after an episode of dizziness and headache.  And vague abdominal discomfort.  He is currently asymptomatic.  His vitals are normal he is not hypertensive he is in no distress his abdomen is soft and nontender heart and lungs are normal on examination.  Strong pulses in all 4 extremities.  Plan is get a be screening labs troponin checking for endorgan damage.  Chest x-ray.  If workup negative stable for discharge with outpatient follow-up given that he is asymptomatic.

## 2024-12-20 NOTE — ED POST DISCHARGE NOTE - DETAILS
attempted to contact patient for possible infiltrate, no answer. pt with dizziness. recent travel. would see how pt is feeling to see if pt needs reeval or tx pt with dizziness. would see how pt is feeling to see if pt needs reeval or tx GUNNAR Thompson: left message. sent mailgram

## 2024-12-20 NOTE — ED ADULT NURSE NOTE - OBJECTIVE STATEMENT
I have a high BP today, feels unusual, dizzy , lightheaded  takes Plavix  , has a 5 cardiac stents , I take rifampin and azithromycin for lung infection. Denies SOB & blurry vision

## 2024-12-20 NOTE — ED PROVIDER NOTE - PATIENT PORTAL LINK FT
You can access the FollowMyHealth Patient Portal offered by [facility] by registering at the following website: [email or web address]. By joining [facility]’s FollowMyHealth portal, you will also be able to view your health information using other applications (apps) compatible with our system.

## 2024-12-20 NOTE — ED PROVIDER NOTE - PROGRESS NOTE DETAILS
Twelve-lead is normal sinus rhythm.  No ischemic changes on it.  Heart rate 76. An extensive discussion was had with the patient regarding labs/imaging and tests prior to discharge. We discussed in depth the importance of follow up for continuing medical care as an outpatient. The patient was advised to return the Emergency Department for worsening or persistent symptoms, and/or any new or concerning symptoms.

## 2024-12-20 NOTE — ED PROVIDER NOTE - OBJECTIVE STATEMENT
This is an adult male with a history of 3 cardiac stents present and hypertension who takes metoprolol 50 in the morning amlodipine 5 in the morning and then metoprolol 50 at night and another amlodipine 5 at night if he notices blood pressure is high, presents the ED today with an episode of feeling dizzy as well as frontal head pressure and vague discomfort in his abdomen.  He took his blood pressure at that time and noticed it was high 160s over 94.  He took his dose of amlodipine which is extra and he said he slowly began to feel better.  He denied chest pain.  He denied palpitations.  No respiratory distress.  No LOC or falls.  No vomiting.  Positive nausea.  He did see his PCP earlier today and felt normal at that time.

## 2024-12-20 NOTE — ED ADULT TRIAGE NOTE - CHIEF COMPLAINT QUOTE
I have a high BP today, feels unusual, dizzy , lightheaded  takes Plavix  , has a 5 cardiac stents , I take rifampin and azithromycin for lung infection

## 2024-12-20 NOTE — ED ADULT TRIAGE NOTE - HOW PATIENT ADDRESSED, PROFILE
Dougied PAST MEDICAL HISTORY:  HTN (hypertension)     Mass of right breast      PAST MEDICAL HISTORY:  Chronic kidney disease (CKD)     HTN (hypertension)     Mass of right breast     Prediabetes     Respiratory disorder

## 2024-12-28 ENCOUNTER — EMERGENCY (EMERGENCY)
Facility: HOSPITAL | Age: 64
LOS: 1 days | Discharge: HOME | End: 2024-12-28
Attending: STUDENT IN AN ORGANIZED HEALTH CARE EDUCATION/TRAINING PROGRAM
Payer: COMMERCIAL

## 2024-12-28 VITALS
SYSTOLIC BLOOD PRESSURE: 148 MMHG | DIASTOLIC BLOOD PRESSURE: 97 MMHG | TEMPERATURE: 99 F | OXYGEN SATURATION: 97 % | HEART RATE: 88 BPM | HEIGHT: 74 IN | RESPIRATION RATE: 18 BRPM | WEIGHT: 169.98 LBS

## 2024-12-28 DIAGNOSIS — Z95.1 PRESENCE OF AORTOCORONARY BYPASS GRAFT: Chronic | ICD-10-CM

## 2024-12-28 LAB
ALBUMIN SERPL ELPH-MCNC: 3.6 G/DL — SIGNIFICANT CHANGE UP (ref 3.5–5)
ALP SERPL-CCNC: 40 U/L — SIGNIFICANT CHANGE UP (ref 40–120)
ALT FLD-CCNC: 18 U/L DA — SIGNIFICANT CHANGE UP (ref 10–60)
ANION GAP SERPL CALC-SCNC: 6 MMOL/L — SIGNIFICANT CHANGE UP (ref 5–17)
AST SERPL-CCNC: 14 U/L — SIGNIFICANT CHANGE UP (ref 10–40)
BASE EXCESS BLDV CALC-SCNC: -0.2 MMOL/L — SIGNIFICANT CHANGE UP
BASOPHILS # BLD AUTO: 0.06 K/UL — SIGNIFICANT CHANGE UP (ref 0–0.2)
BASOPHILS NFR BLD AUTO: 1 % — SIGNIFICANT CHANGE UP (ref 0–2)
BILIRUB DIRECT SERPL-MCNC: 0.2 MG/DL — SIGNIFICANT CHANGE UP (ref 0–0.3)
BILIRUB INDIRECT FLD-MCNC: 0.1 MG/DL — LOW (ref 0.2–1)
BILIRUB SERPL-MCNC: 0.3 MG/DL — SIGNIFICANT CHANGE UP (ref 0.2–1.2)
BLOOD GAS COMMENTS, VENOUS: SIGNIFICANT CHANGE UP
BUN SERPL-MCNC: 10 MG/DL — SIGNIFICANT CHANGE UP (ref 7–18)
CALCIUM SERPL-MCNC: 8.7 MG/DL — SIGNIFICANT CHANGE UP (ref 8.4–10.5)
CHLORIDE SERPL-SCNC: 97 MMOL/L — SIGNIFICANT CHANGE UP (ref 96–108)
CO2 SERPL-SCNC: 25 MMOL/L — SIGNIFICANT CHANGE UP (ref 22–31)
CREAT SERPL-MCNC: 0.76 MG/DL — SIGNIFICANT CHANGE UP (ref 0.5–1.3)
EGFR: 99 ML/MIN/1.73M2 — SIGNIFICANT CHANGE UP
EOSINOPHIL # BLD AUTO: 0.79 K/UL — HIGH (ref 0–0.5)
EOSINOPHIL NFR BLD AUTO: 13.3 % — HIGH (ref 0–6)
GLUCOSE SERPL-MCNC: 200 MG/DL — HIGH (ref 70–99)
HCO3 BLDV-SCNC: 26 MMOL/L — SIGNIFICANT CHANGE UP (ref 22–29)
HCT VFR BLD CALC: 37.3 % — LOW (ref 39–50)
HGB BLD-MCNC: 13.2 G/DL — SIGNIFICANT CHANGE UP (ref 13–17)
IMM GRANULOCYTES NFR BLD AUTO: 0.2 % — SIGNIFICANT CHANGE UP (ref 0–0.9)
LYMPHOCYTES # BLD AUTO: 1.55 K/UL — SIGNIFICANT CHANGE UP (ref 1–3.3)
LYMPHOCYTES # BLD AUTO: 26.1 % — SIGNIFICANT CHANGE UP (ref 13–44)
MAGNESIUM SERPL-MCNC: 1.4 MG/DL — LOW (ref 1.6–2.6)
MCHC RBC-ENTMCNC: 31.4 PG — SIGNIFICANT CHANGE UP (ref 27–34)
MCHC RBC-ENTMCNC: 35.4 G/DL — SIGNIFICANT CHANGE UP (ref 32–36)
MCV RBC AUTO: 88.8 FL — SIGNIFICANT CHANGE UP (ref 80–100)
MONOCYTES # BLD AUTO: 0.59 K/UL — SIGNIFICANT CHANGE UP (ref 0–0.9)
MONOCYTES NFR BLD AUTO: 9.9 % — SIGNIFICANT CHANGE UP (ref 2–14)
NEUTROPHILS # BLD AUTO: 2.95 K/UL — SIGNIFICANT CHANGE UP (ref 1.8–7.4)
NEUTROPHILS NFR BLD AUTO: 49.5 % — SIGNIFICANT CHANGE UP (ref 43–77)
NRBC # BLD: 0 /100 WBCS — SIGNIFICANT CHANGE UP (ref 0–0)
PCO2 BLDV: 47 MMHG — SIGNIFICANT CHANGE UP (ref 42–55)
PH BLDV: 7.35 — SIGNIFICANT CHANGE UP (ref 7.32–7.43)
PHOSPHATE SERPL-MCNC: 3.4 MG/DL — SIGNIFICANT CHANGE UP (ref 2.5–4.5)
PLATELET # BLD AUTO: 240 K/UL — SIGNIFICANT CHANGE UP (ref 150–400)
PO2 BLDV: 37 MMHG — SIGNIFICANT CHANGE UP
POTASSIUM SERPL-MCNC: 4.3 MMOL/L — SIGNIFICANT CHANGE UP (ref 3.5–5.3)
POTASSIUM SERPL-SCNC: 4.3 MMOL/L — SIGNIFICANT CHANGE UP (ref 3.5–5.3)
PROT SERPL-MCNC: 6.9 G/DL — SIGNIFICANT CHANGE UP (ref 6–8.3)
RBC # BLD: 4.2 M/UL — SIGNIFICANT CHANGE UP (ref 4.2–5.8)
RBC # FLD: 12.4 % — SIGNIFICANT CHANGE UP (ref 10.3–14.5)
SAO2 % BLDV: 60.6 % — SIGNIFICANT CHANGE UP
SODIUM SERPL-SCNC: 128 MMOL/L — LOW (ref 135–145)
TROPONIN I, HIGH SENSITIVITY RESULT: 6.2 NG/L — SIGNIFICANT CHANGE UP
WBC # BLD: 5.95 K/UL — SIGNIFICANT CHANGE UP (ref 3.8–10.5)
WBC # FLD AUTO: 5.95 K/UL — SIGNIFICANT CHANGE UP (ref 3.8–10.5)

## 2024-12-28 PROCEDURE — 99285 EMERGENCY DEPT VISIT HI MDM: CPT

## 2024-12-28 PROCEDURE — 83735 ASSAY OF MAGNESIUM: CPT

## 2024-12-28 PROCEDURE — 84100 ASSAY OF PHOSPHORUS: CPT

## 2024-12-28 PROCEDURE — 93005 ELECTROCARDIOGRAM TRACING: CPT

## 2024-12-28 PROCEDURE — 84484 ASSAY OF TROPONIN QUANT: CPT

## 2024-12-28 PROCEDURE — 36415 COLL VENOUS BLD VENIPUNCTURE: CPT

## 2024-12-28 PROCEDURE — 80076 HEPATIC FUNCTION PANEL: CPT

## 2024-12-28 PROCEDURE — 85025 COMPLETE CBC W/AUTO DIFF WBC: CPT

## 2024-12-28 PROCEDURE — 80048 BASIC METABOLIC PNL TOTAL CA: CPT

## 2024-12-28 PROCEDURE — 99284 EMERGENCY DEPT VISIT MOD MDM: CPT | Mod: 25

## 2024-12-28 PROCEDURE — 96374 THER/PROPH/DIAG INJ IV PUSH: CPT

## 2024-12-28 PROCEDURE — 82962 GLUCOSE BLOOD TEST: CPT

## 2024-12-28 PROCEDURE — 82803 BLOOD GASES ANY COMBINATION: CPT

## 2024-12-28 RX ORDER — MECLIZINE HCL 12.5 MG
12.5 TABLET ORAL ONCE
Refills: 0 | Status: COMPLETED | OUTPATIENT
Start: 2024-12-28 | End: 2024-12-28

## 2024-12-28 RX ORDER — SODIUM CHLORIDE 9 MG/ML
500 INJECTION, SOLUTION INTRAMUSCULAR; INTRAVENOUS; SUBCUTANEOUS ONCE
Refills: 0 | Status: COMPLETED | OUTPATIENT
Start: 2024-12-28 | End: 2024-12-28

## 2024-12-28 RX ORDER — MECLIZINE HCL 12.5 MG
1 TABLET ORAL
Qty: 42 | Refills: 0
Start: 2024-12-28 | End: 2025-01-10

## 2024-12-28 RX ADMIN — Medication 100 GRAM(S): at 23:20

## 2024-12-28 RX ADMIN — SODIUM CHLORIDE 500 MILLILITER(S): 9 INJECTION, SOLUTION INTRAMUSCULAR; INTRAVENOUS; SUBCUTANEOUS at 22:02

## 2024-12-28 RX ADMIN — Medication 12.5 MILLIGRAM(S): at 22:02

## 2024-12-28 NOTE — ED PROVIDER NOTE - OBJECTIVE STATEMENT
64 male presents for episode of dizziness and elevated blood pressure while at home.  Patient states this morning he took his blood pressure medication and shortly after began to feel persistently dizzy.  Family gave him food out of concern that his blood sugar may be low.  Patient ate multiple times and still felt persistently dizzy after taking his morning dose of medications.  At the bedside reports his dizziness has resolved prior to arrival and has no complaints.      GENERAL APPEARANCE:  AAOx3, generally well-appearing, no acute distress. Appears stated age.  HEENT:  NCAT. Moist mucous membranes. EOMI, clear conjunctiva, no slceral icterus, oropharynx clear.  NECK:  Supple without lymphadenopathy. No JVD.  No neck stiffness or restricted ROM.  HEART:  Normal heart rate and regular rhythm. No murmur.   LUNGS:  CTAB, moving air well. No crackles or wheezes are heard.  ABDOMEN:  Soft, nontender, non-distended. Negative Rosen. Negative McBurney. No rebound or guarding.  CHEST/BACK: Chest wall non-tender. No CVAT, or midline cervical/thoracic/lumbar tenderness to palpation. No obvious deformity of chest wall or back.  EXTREMITIES:  Without cyanosis, clubbing or edema. Pulse intact x 4. FROM x4. Compartments soft in all extremities.  NEUROLOGICAL:  Grossly non-focal. Alert and oriented, moving all 4 extremities. CN II-XII grossly intact. Observed to ambulate with normal gait.  SKIN:  Warm and dry without any rash.  PSYCH: Calm, cooperative. Normal mood and affect. Demonstrates proper insight and judgement.

## 2024-12-28 NOTE — ED PROVIDER NOTE - CLINICAL SUMMARY MEDICAL DECISION MAKING FREE TEXT BOX
This patient presents with dizziness, most consistent with a peripheral cause, likely BPPV. No history of recent infection so doubt vestibular neuritis. History not consistent with meniere's disease. No history of trauma. No red flag features for central vertigo to include gradual onset, vertical/bidirectional or non-fatigable nystagmus, focal neurologic findings on exam (including inability to ambulate, ataxia, dysmetria). Presentation not consistent with an acute CNS infection, vertebral basilar artery insufficiency, cerebellar hemorrhage or infarction, intracranial mass or bleed.    Reassessment: Prior to discharge symptoms controlled, patient well appearing.  Disposition:  Discharge. Strict return precautions discussed w/ full understanding. Advise follow up with primary care provider within 24-48 hours.

## 2024-12-28 NOTE — ED ADULT NURSE NOTE - NSFALLRISKINTERV_ED_ALL_ED

## 2024-12-28 NOTE — ED PROVIDER NOTE - PATIENT PORTAL LINK FT
You can access the FollowMyHealth Patient Portal offered by [facility] by registering at the following website: [email or web address]. By joining [email or web address]’s FollowMyHealth portal, you will also be able to view your health information using other applications (apps) compatible with our system.

## 2024-12-29 VITALS
TEMPERATURE: 98 F | DIASTOLIC BLOOD PRESSURE: 86 MMHG | OXYGEN SATURATION: 97 % | HEART RATE: 80 BPM | RESPIRATION RATE: 18 BRPM | SYSTOLIC BLOOD PRESSURE: 135 MMHG

## 2025-04-23 NOTE — DISCHARGE NOTE PROVIDER - NSDCHC_MEDRECSTATUS_GEN_ALL_CORE
Consent: Written consent obtained.  The risks were reviewed with the patient including but not limited to: burn, pigmentary changes, pain, blistering, scabbing, redness, increased risk of skin cancers, and the remote possibility of scarring. Total Treatment Time: 2:10 Protocol For Nbuvb: The patient received NBUVB. Admission Reconciliation is Completed  Discharge Reconciliation is Not Complete Protocol For Photochemotherapy: Tar And Broad Band Uvb (Goeckerman Treatment): The patient received Photochemotherapy: Tar and Broad Band UVB (Goeckerman treatment). Protocol For Photochemotherapy For Severe Photoresponsive Dermatoses: Petrolatum And Broad Band Uvb: The patient received Photochemotherapyfor severe photoresponsive dermatoses: Petrolatum and Broad Band UVB requiring at least 4 to 8 hours of care under direct physician supervision. Protocol For Photochemotherapy: Mineral Oil And Nbuvb: The patient received Photochemotherapy: Baby Oil and NBUVB (baby oil applied to all lesions prior to phototherapy). Protocol For Photochemotherapy For Severe Photoresponsive Dermatoses: Petrolatum And Nbuvb: The patient received Photochemotherapy for severe photoresponsive dermatoses: Petrolatum and NBUVB requiring at least 4 to 8 hours of care under direct physician supervision. Detail Level: Generalized Name Of Supervising Technician: JERALD Rosas Protocol For Protocol For Photochemotherapy For Severe Photoresponsive Dermatoses: Bath Puva: The patient received Photochemotherapy for severe photoresponsive dermatoses: Bath PUVA requiring at least 4 to 8 hours of care under direct physician supervision. Protocol: NBUVB Post-Care Instructions: I reviewed with the patient in detail post-care instructions. Patient is to wear sun protection. Patients may expect sunburn like redness, discomfort and scabbing. Admission Reconciliation is Completed  Discharge Reconciliation is Completed Protocol For Broad Band Uvb: The patient received Broad Band UVB. Protocol For Photochemotherapy For Severe Photoresponsive Dermatoses: Tar And Broad Band Uvb (Goeckerman Treatment): The patient received Photochemotherapy for severe photoresponsive dermatoses: Tar and Broad Band UVB (Goeckerman treatment) requiring at least 4 to 8 hours of care under direct physician supervision. Changes In Treatment Protocol: Pt agreed to increase today. Protocol For Bath Puva: The patient received Bath PUVA. Protocol For Photochemotherapy For Severe Photoresponsive Dermatoses: Tar And Nbuvb (Goeckerman Treatment): The patient received Photochemotherapy for severe photoresponsive dermatoses: Tar and NBUVB (Goeckerman treatment) requiring at least 4 to 8 hours of care under direct physician supervision. Protocol For Photochemotherapy: Tar And Nbuvb (Goeckerman Treatment): The patient received Photochemotherapy: Tar and NBUVB (Goeckerman treatment). Protocol For Uva1: The patient received UVA1. Protocol For Photochemotherapy: Mineral Oil And Broad Band Uvb: The patient received Photochemotherapy: Mineral Oil and Broad Band UVB. Protocol For Photochemotherapy For Severe Photoresponsive Dermatoses: Puva: The patient received Photochemotherapy for severe photoresponsive dermatoses: PUVA requiring at least 4 to 8 hours of care under direct physician supervision. Protocol For Photochemotherapy: Petrolatum And Nbuvb: The patient received Photochemotherapy: Petrolatum and NBUVB (petrolatum applied to all lesions prior to phototherapy). Protocol For Photochemotherapy: Triamcinolone Ointment And Nbuvb: The patient received Photochemotherapy: Triamcinolone and NBUVB (triamcinolone ointment applied to all lesions prior to phototherapy). Treatment Number: 12 Protocol For Uva: The patient received UVA. Protocol For Photochemotherapy: Petrolatum And Broad Band Uvb: The patient received Photochemotherapy: Petrolatum and Broad Band UVB. Render Post-Care In The Note: no Protocol For Puva: The patient received PUVA. Total Body Energy: 363 Mjoules Protocol For Nb Uva: The patient received NB UVA.

## 2025-06-02 NOTE — ED PROVIDER NOTE - NS ED SCRIBE STATEMENT
Pt is on the recall list for an appt do discuss a colon, he was in the hospital for last scheduled appt.  I called the pt to schedule an appt, pt stated when he was in the hospital the found Colon Cancer and removed a tumor and he is following up with his Oncologist.  Declined to schedule an appt.   Attending

## 2025-07-18 ENCOUNTER — EMERGENCY (EMERGENCY)
Facility: HOSPITAL | Age: 65
LOS: 1 days | End: 2025-07-18
Attending: STUDENT IN AN ORGANIZED HEALTH CARE EDUCATION/TRAINING PROGRAM
Payer: COMMERCIAL

## 2025-07-18 DIAGNOSIS — Z95.1 PRESENCE OF AORTOCORONARY BYPASS GRAFT: Chronic | ICD-10-CM

## 2025-07-18 PROCEDURE — 99285 EMERGENCY DEPT VISIT HI MDM: CPT

## 2025-07-19 VITALS
OXYGEN SATURATION: 99 % | TEMPERATURE: 100 F | WEIGHT: 172.18 LBS | RESPIRATION RATE: 18 BRPM | SYSTOLIC BLOOD PRESSURE: 123 MMHG | HEIGHT: 74 IN | DIASTOLIC BLOOD PRESSURE: 81 MMHG | HEART RATE: 99 BPM

## 2025-07-19 LAB
ALBUMIN SERPL ELPH-MCNC: 3.8 G/DL — SIGNIFICANT CHANGE UP (ref 3.5–5)
ALP SERPL-CCNC: 58 U/L — SIGNIFICANT CHANGE UP (ref 40–120)
ALT FLD-CCNC: 28 U/L DA — SIGNIFICANT CHANGE UP (ref 10–60)
ANION GAP SERPL CALC-SCNC: 6 MMOL/L — SIGNIFICANT CHANGE UP (ref 5–17)
APPEARANCE UR: CLEAR — SIGNIFICANT CHANGE UP
AST SERPL-CCNC: 19 U/L — SIGNIFICANT CHANGE UP (ref 10–40)
BASOPHILS # BLD AUTO: 0.04 K/UL — SIGNIFICANT CHANGE UP (ref 0–0.2)
BASOPHILS NFR BLD AUTO: 0.4 % — SIGNIFICANT CHANGE UP (ref 0–2)
BILIRUB SERPL-MCNC: 0.5 MG/DL — SIGNIFICANT CHANGE UP (ref 0.2–1.2)
BILIRUB UR-MCNC: NEGATIVE — SIGNIFICANT CHANGE UP
BUN SERPL-MCNC: 11 MG/DL — SIGNIFICANT CHANGE UP (ref 7–18)
CALCIUM SERPL-MCNC: 8.5 MG/DL — SIGNIFICANT CHANGE UP (ref 8.4–10.5)
CHLORIDE SERPL-SCNC: 100 MMOL/L — SIGNIFICANT CHANGE UP (ref 96–108)
CO2 SERPL-SCNC: 28 MMOL/L — SIGNIFICANT CHANGE UP (ref 22–31)
COLOR SPEC: YELLOW — SIGNIFICANT CHANGE UP
CREAT SERPL-MCNC: 1.2 MG/DL — SIGNIFICANT CHANGE UP (ref 0.5–1.3)
DIFF PNL FLD: NEGATIVE — SIGNIFICANT CHANGE UP
EGFR: 66 ML/MIN/1.73M2 — SIGNIFICANT CHANGE UP
EGFR: 66 ML/MIN/1.73M2 — SIGNIFICANT CHANGE UP
EOSINOPHIL # BLD AUTO: 0.41 K/UL — SIGNIFICANT CHANGE UP (ref 0–0.5)
EOSINOPHIL NFR BLD AUTO: 3.9 % — SIGNIFICANT CHANGE UP (ref 0–6)
FLUAV AG NPH QL: DETECTED
FLUBV AG NPH QL: SIGNIFICANT CHANGE UP
GLUCOSE SERPL-MCNC: 143 MG/DL — HIGH (ref 70–99)
GLUCOSE UR QL: >=1000 MG/DL
HCT VFR BLD CALC: 39.9 % — SIGNIFICANT CHANGE UP (ref 39–50)
HGB BLD-MCNC: 13.5 G/DL — SIGNIFICANT CHANGE UP (ref 13–17)
IMM GRANULOCYTES NFR BLD AUTO: 0.3 % — SIGNIFICANT CHANGE UP (ref 0–0.9)
KETONES UR QL: NEGATIVE MG/DL — SIGNIFICANT CHANGE UP
LACTATE SERPL-SCNC: 2.5 MMOL/L — HIGH (ref 0.7–2)
LEUKOCYTE ESTERASE UR-ACNC: NEGATIVE — SIGNIFICANT CHANGE UP
LYMPHOCYTES # BLD AUTO: 0.8 K/UL — LOW (ref 1–3.3)
LYMPHOCYTES # BLD AUTO: 7.6 % — LOW (ref 13–44)
MAGNESIUM SERPL-MCNC: 1.6 MG/DL — SIGNIFICANT CHANGE UP (ref 1.6–2.6)
MCHC RBC-ENTMCNC: 29.7 PG — SIGNIFICANT CHANGE UP (ref 27–34)
MCHC RBC-ENTMCNC: 33.8 G/DL — SIGNIFICANT CHANGE UP (ref 32–36)
MCV RBC AUTO: 87.7 FL — SIGNIFICANT CHANGE UP (ref 80–100)
MONOCYTES # BLD AUTO: 0.56 K/UL — SIGNIFICANT CHANGE UP (ref 0–0.9)
MONOCYTES NFR BLD AUTO: 5.3 % — SIGNIFICANT CHANGE UP (ref 2–14)
NEUTROPHILS # BLD AUTO: 8.7 K/UL — HIGH (ref 1.8–7.4)
NEUTROPHILS NFR BLD AUTO: 82.5 % — HIGH (ref 43–77)
NITRITE UR-MCNC: NEGATIVE — SIGNIFICANT CHANGE UP
NRBC BLD AUTO-RTO: 0 /100 WBCS — SIGNIFICANT CHANGE UP (ref 0–0)
PH UR: 7 — SIGNIFICANT CHANGE UP (ref 5–8)
PLATELET # BLD AUTO: 136 K/UL — LOW (ref 150–400)
POTASSIUM SERPL-MCNC: 4.5 MMOL/L — SIGNIFICANT CHANGE UP (ref 3.5–5.3)
POTASSIUM SERPL-SCNC: 4.5 MMOL/L — SIGNIFICANT CHANGE UP (ref 3.5–5.3)
PROT SERPL-MCNC: 7.2 G/DL — SIGNIFICANT CHANGE UP (ref 6–8.3)
PROT UR-MCNC: NEGATIVE MG/DL — SIGNIFICANT CHANGE UP
RBC # BLD: 4.55 M/UL — SIGNIFICANT CHANGE UP (ref 4.2–5.8)
RBC # FLD: 13.2 % — SIGNIFICANT CHANGE UP (ref 10.3–14.5)
RSV RNA NPH QL NAA+NON-PROBE: SIGNIFICANT CHANGE UP
SARS-COV-2 RNA SPEC QL NAA+PROBE: SIGNIFICANT CHANGE UP
SODIUM SERPL-SCNC: 134 MMOL/L — LOW (ref 135–145)
SOURCE RESPIRATORY: SIGNIFICANT CHANGE UP
SP GR SPEC: 1.02 — SIGNIFICANT CHANGE UP (ref 1–1.03)
UROBILINOGEN FLD QL: 0.2 MG/DL — SIGNIFICANT CHANGE UP (ref 0.2–1)
WBC # BLD: 10.54 K/UL — HIGH (ref 3.8–10.5)
WBC # FLD AUTO: 10.54 K/UL — HIGH (ref 3.8–10.5)

## 2025-07-19 PROCEDURE — 99284 EMERGENCY DEPT VISIT MOD MDM: CPT | Mod: 25

## 2025-07-19 PROCEDURE — 71045 X-RAY EXAM CHEST 1 VIEW: CPT

## 2025-07-19 PROCEDURE — 85025 COMPLETE CBC W/AUTO DIFF WBC: CPT

## 2025-07-19 PROCEDURE — 83735 ASSAY OF MAGNESIUM: CPT

## 2025-07-19 PROCEDURE — 80053 COMPREHEN METABOLIC PANEL: CPT

## 2025-07-19 PROCEDURE — 83605 ASSAY OF LACTIC ACID: CPT

## 2025-07-19 PROCEDURE — 96375 TX/PRO/DX INJ NEW DRUG ADDON: CPT

## 2025-07-19 PROCEDURE — 36415 COLL VENOUS BLD VENIPUNCTURE: CPT

## 2025-07-19 PROCEDURE — 96374 THER/PROPH/DIAG INJ IV PUSH: CPT

## 2025-07-19 PROCEDURE — 71045 X-RAY EXAM CHEST 1 VIEW: CPT | Mod: 26

## 2025-07-19 PROCEDURE — 81003 URINALYSIS AUTO W/O SCOPE: CPT

## 2025-07-19 PROCEDURE — 96361 HYDRATE IV INFUSION ADD-ON: CPT

## 2025-07-19 PROCEDURE — 87637 SARSCOV2&INF A&B&RSV AMP PRB: CPT

## 2025-07-19 RX ORDER — ACETAMINOPHEN 500 MG/5ML
975 LIQUID (ML) ORAL ONCE
Refills: 0 | Status: COMPLETED | OUTPATIENT
Start: 2025-07-19 | End: 2025-07-19

## 2025-07-19 RX ORDER — ONDANSETRON HCL/PF 4 MG/2 ML
4 VIAL (ML) INJECTION ONCE
Refills: 0 | Status: COMPLETED | OUTPATIENT
Start: 2025-07-19 | End: 2025-07-19

## 2025-07-19 RX ORDER — KETOROLAC TROMETHAMINE 30 MG/ML
15 INJECTION, SOLUTION INTRAMUSCULAR; INTRAVENOUS ONCE
Refills: 0 | Status: DISCONTINUED | OUTPATIENT
Start: 2025-07-19 | End: 2025-07-19

## 2025-07-19 RX ADMIN — Medication 1000 MILLILITER(S): at 03:01

## 2025-07-19 RX ADMIN — Medication 4 MILLIGRAM(S): at 03:00

## 2025-07-19 RX ADMIN — KETOROLAC TROMETHAMINE 15 MILLIGRAM(S): 30 INJECTION, SOLUTION INTRAMUSCULAR; INTRAVENOUS at 03:30

## 2025-07-19 RX ADMIN — Medication 975 MILLIGRAM(S): at 04:20

## 2025-07-19 RX ADMIN — Medication 1000 MILLILITER(S): at 04:16

## 2025-07-19 RX ADMIN — KETOROLAC TROMETHAMINE 15 MILLIGRAM(S): 30 INJECTION, SOLUTION INTRAMUSCULAR; INTRAVENOUS at 03:00

## 2025-07-19 NOTE — ED PROVIDER NOTE - OBJECTIVE STATEMENT
65-year-old male  hx of HTN, HLD, NIDDM, CAD w/stent, presenting with fevers, body aches, congestion, runny nose, sneezing, headaches for the past day. No chest pain. Mild shortness of breath. 65-year-old male  hx of HTN, HLD, NIDDM, CAD w/stent, presenting with fevers, body aches, congestion, runny nose, sneezing, headaches for the past day. No chest pain. Mild shortness of breath. No other symptoms.

## 2025-07-19 NOTE — ED PROVIDER NOTE - NSFOLLOWUPINSTRUCTIONS_ED_ALL_ED_FT
You were seen in the emergency department for: fevers/bodyaches  Your diagnosis for this visit was: influenza  Make sure you stay hydrated.   Please take Tylenol 1000mg every 6 hours as needed or Ibuprofen 600mg every 6 hours as needed - you can alternate every 3 hours as needed.   We recommend you follow up with: your primary care doctor.     Please return to the Emergency Department if you experience any of the following symptoms:   - Shortness of breath or trouble breathing  - Pressure, pain or tightness in the chest  - Face drooping, arm weakness or speech difficulty  - Persistence of severe vomiting  - Head injury or loss of consciousness  - Nonstop bleeding or an open wound    (1) Follow up with your primary care physician within the next 24-48 hours as discussed. In addition, we did not find evidence of a life threatening illness on your testing here today, but listed below are the specialists that will be necessary to see as an outpatient to continue the workup.  Please call the numbers listed below or [phone] to set up the necessary appointments.  (2) Take Tylenol (up to 1000mg or 1 g)  and/or Motrin (up to 600mg) up to every 6 hours as needed for pain.   (3) If you had an IV (intravenous) line placed, it was removed. Sometimes, after IV removal, that area can be tender for a few days; if it develops redness and swelling, those could be signs of infection; in which case, return to the Emergency Department for assessment.  (4) Please continue taking all of your home medications as directed.

## 2025-07-19 NOTE — ED ADULT NURSE NOTE - SUICIDE SCREENING QUESTION 3
No [Restricted in physically strenuous activity but ambulatory and able to carry out work of a light or sedentary nature] : Status 1- Restricted in physically strenuous activity but ambulatory and able to carry out work of a light or sedentary nature, e.g., light house work, office work [Normal] : affect appropriate [de-identified] : wearing glasses

## 2025-07-19 NOTE — ED PROVIDER NOTE - CLINICAL SUMMARY MEDICAL DECISION MAKING FREE TEXT BOX
65-year-old male  hx of HTN, HLD, NIDDM, CAD w/stent, presenting with fevers, body aches, congestion, runny nose, sneezing, headaches for the past day. No chest pain. Mild shortness of breath. No other symptoms. Workup positive for influenza. Labs ok. CXR ok.Feels much better after IVF/[place].  Will discharge.

## 2025-07-19 NOTE — ED PROVIDER NOTE - PATIENT PORTAL LINK FT
You can access the FollowMyHealth Patient Portal offered by [facility] by registering at the following website: [email or web address]. By joining [employer]’s FollowMyHealth portal, you will also be able to view your health information using other applications (apps) compatible with our system.

## 2025-07-19 NOTE — ED PROVIDER NOTE - ENMT, MLM
Airway patent, Nasal mucosa clear. Mouth with normal mucosa. Throat has no vesicles, no oropharyngeal exudates and uvula is midline. Eucrisa Counseling: Patient may experience a mild burning sensation during topical application. Eucrisa is not approved in children less than 2 years of age.